# Patient Record
Sex: MALE | Race: WHITE | NOT HISPANIC OR LATINO | Employment: OTHER | URBAN - METROPOLITAN AREA
[De-identification: names, ages, dates, MRNs, and addresses within clinical notes are randomized per-mention and may not be internally consistent; named-entity substitution may affect disease eponyms.]

---

## 2017-01-23 ENCOUNTER — TELEPHONE (OUTPATIENT)
Dept: FAMILY MEDICINE | Facility: CLINIC | Age: 67
End: 2017-01-23

## 2017-01-23 RX ORDER — INDOMETHACIN 25 MG/1
25 CAPSULE ORAL 3 TIMES DAILY PRN
Qty: 60 CAPSULE | Refills: 2 | Status: SHIPPED | OUTPATIENT
Start: 2017-01-23 | End: 2017-04-26 | Stop reason: SDUPTHER

## 2017-01-23 NOTE — TELEPHONE ENCOUNTER
Pt states that he can not tolerate etodolac, it makes him feel run down.. Pt wants to go back on indomethacin 25 mg tid.  Allergies and pharmacy verified.  Please advise.

## 2017-01-23 NOTE — TELEPHONE ENCOUNTER
----- Message from Gabriel Nunes sent at 1/23/2017  4:01 PM CST -----  Contact: Patient  Patient called regarding Rx refill. Have questions about medication please call back at [phone]. Thanks,

## 2017-01-30 RX ORDER — CLOPIDOGREL BISULFATE 75 MG/1
75 TABLET ORAL DAILY
Qty: 30 TABLET | Refills: 0 | Status: SHIPPED | OUTPATIENT
Start: 2017-01-30 | End: 2017-02-01 | Stop reason: SDUPTHER

## 2017-02-01 RX ORDER — CLOPIDOGREL BISULFATE 75 MG/1
75 TABLET ORAL DAILY
Qty: 30 TABLET | Refills: 1 | Status: SHIPPED | OUTPATIENT
Start: 2017-02-01 | End: 2017-04-26 | Stop reason: SDUPTHER

## 2017-03-28 ENCOUNTER — OFFICE VISIT (OUTPATIENT)
Dept: CARDIOLOGY | Facility: CLINIC | Age: 67
End: 2017-03-28
Payer: MEDICARE

## 2017-03-28 VITALS
HEART RATE: 62 BPM | HEIGHT: 71 IN | WEIGHT: 216.94 LBS | SYSTOLIC BLOOD PRESSURE: 149 MMHG | BODY MASS INDEX: 30.37 KG/M2 | DIASTOLIC BLOOD PRESSURE: 63 MMHG

## 2017-03-28 DIAGNOSIS — I10 ESSENTIAL HYPERTENSION: ICD-10-CM

## 2017-03-28 DIAGNOSIS — E78.5 HYPERLIPIDEMIA, UNSPECIFIED HYPERLIPIDEMIA TYPE: ICD-10-CM

## 2017-03-28 DIAGNOSIS — I25.9 ISCHEMIC HEART DISEASE DUE TO CORONARY ARTERY OBSTRUCTION: Primary | ICD-10-CM

## 2017-03-28 DIAGNOSIS — I65.23 CAROTID STENOSIS, ASYMPTOMATIC, BILATERAL: ICD-10-CM

## 2017-03-28 DIAGNOSIS — I24.0 ISCHEMIC HEART DISEASE DUE TO CORONARY ARTERY OBSTRUCTION: Primary | ICD-10-CM

## 2017-03-28 PROCEDURE — 1157F ADVNC CARE PLAN IN RCRD: CPT | Mod: S$GLB,,, | Performed by: INTERNAL MEDICINE

## 2017-03-28 PROCEDURE — 1160F RVW MEDS BY RX/DR IN RCRD: CPT | Mod: S$GLB,,, | Performed by: INTERNAL MEDICINE

## 2017-03-28 PROCEDURE — 1126F AMNT PAIN NOTED NONE PRSNT: CPT | Mod: S$GLB,,, | Performed by: INTERNAL MEDICINE

## 2017-03-28 PROCEDURE — 3078F DIAST BP <80 MM HG: CPT | Mod: S$GLB,,, | Performed by: INTERNAL MEDICINE

## 2017-03-28 PROCEDURE — 99999 PR PBB SHADOW E&M-EST. PATIENT-LVL II: CPT | Mod: PBBFAC,,, | Performed by: INTERNAL MEDICINE

## 2017-03-28 PROCEDURE — 3077F SYST BP >= 140 MM HG: CPT | Mod: S$GLB,,, | Performed by: INTERNAL MEDICINE

## 2017-03-28 PROCEDURE — 1159F MED LIST DOCD IN RCRD: CPT | Mod: S$GLB,,, | Performed by: INTERNAL MEDICINE

## 2017-03-28 PROCEDURE — 99214 OFFICE O/P EST MOD 30 MIN: CPT | Mod: S$GLB,,, | Performed by: INTERNAL MEDICINE

## 2017-03-28 NOTE — PROGRESS NOTES
Subjective:    Patient ID:  Al Carter is a 67 y.o. male who presents for follow-up of Coronary Artery Disease      HPI  He comes for follow up with no major problems, no chest pain, no shortness of breath.  Still smoking, though    Review of Systems   Constitution: Negative for decreased appetite, weakness, malaise/fatigue, weight gain and weight loss.   Cardiovascular: Negative for chest pain, dyspnea on exertion, leg swelling, palpitations and syncope.   Respiratory: Negative for cough and shortness of breath.    Gastrointestinal: Negative.    All other systems reviewed and are negative.       Objective:    Physical Exam   Constitutional: He is oriented to person, place, and time. He appears well-developed and well-nourished.   HENT:   Head: Normocephalic.   Eyes: Pupils are equal, round, and reactive to light.   Neck: Normal range of motion. Neck supple. No JVD present. Carotid bruit is not present. No thyromegaly present.   Cardiovascular: Normal rate, regular rhythm, normal heart sounds, intact distal pulses and normal pulses.  PMI is not displaced.  Exam reveals no gallop.    No murmur heard.  Pulmonary/Chest: Effort normal and breath sounds normal.   Abdominal: Soft. Normal appearance. He exhibits no mass. There is no hepatosplenomegaly. There is no tenderness.   Musculoskeletal: Normal range of motion. He exhibits no edema.   Neurological: He is alert and oriented to person, place, and time. He has normal strength and normal reflexes. No sensory deficit.   Skin: Skin is warm and intact.   Psychiatric: He has a normal mood and affect.   Nursing note and vitals reviewed.        Assessment:       1. Ischemic heart disease due to coronary artery obstruction    2. Essential hypertension    3. Hyperlipidemia, unspecified hyperlipidemia type         Plan:     Continue all cardiac medications  Regular exercise program  Quit smoking  1 yr f/u with stress echo, carotids, arterial doppler of legs

## 2017-04-26 RX ORDER — CLOPIDOGREL BISULFATE 75 MG/1
TABLET ORAL
Qty: 30 TABLET | Refills: 0 | Status: SHIPPED | OUTPATIENT
Start: 2017-04-26 | End: 2017-05-30 | Stop reason: SDUPTHER

## 2017-04-26 RX ORDER — INDOMETHACIN 25 MG/1
CAPSULE ORAL
Qty: 60 CAPSULE | Refills: 0 | Status: SHIPPED | OUTPATIENT
Start: 2017-04-26 | End: 2018-10-22

## 2017-05-16 ENCOUNTER — TELEPHONE (OUTPATIENT)
Dept: CARDIOLOGY | Facility: CLINIC | Age: 67
End: 2017-05-16

## 2017-05-16 NOTE — TELEPHONE ENCOUNTER
----- Message from Alondra Hamilton sent at 5/16/2017  8:36 AM CDT -----  Contact: Al Andrade is calling to have scheduled test. Please call [phone]. Thanks!

## 2017-05-30 ENCOUNTER — CLINICAL SUPPORT (OUTPATIENT)
Dept: CARDIOLOGY | Facility: CLINIC | Age: 67
End: 2017-05-30
Payer: MEDICARE

## 2017-05-30 DIAGNOSIS — I10 ESSENTIAL HYPERTENSION: ICD-10-CM

## 2017-05-30 DIAGNOSIS — I25.9 ISCHEMIC HEART DISEASE DUE TO CORONARY ARTERY OBSTRUCTION: ICD-10-CM

## 2017-05-30 DIAGNOSIS — I65.23 CAROTID STENOSIS, ASYMPTOMATIC, BILATERAL: ICD-10-CM

## 2017-05-30 DIAGNOSIS — E78.5 HYPERLIPIDEMIA, UNSPECIFIED HYPERLIPIDEMIA TYPE: ICD-10-CM

## 2017-05-30 DIAGNOSIS — I24.0 ISCHEMIC HEART DISEASE DUE TO CORONARY ARTERY OBSTRUCTION: ICD-10-CM

## 2017-05-30 LAB
INTERNAL CAROTID STENOSIS: NORMAL
MITRAL VALVE MOBILITY: NORMAL
RETIRED EF AND QEF - SEE NOTES: 50 (ref 55–65)

## 2017-05-30 PROCEDURE — 93925 LOWER EXTREMITY STUDY: CPT | Mod: S$GLB,,, | Performed by: INTERNAL MEDICINE

## 2017-05-30 PROCEDURE — 93321 DOPPLER ECHO F-UP/LMTD STD: CPT | Mod: S$GLB,,, | Performed by: INTERNAL MEDICINE

## 2017-05-30 PROCEDURE — 93880 EXTRACRANIAL BILAT STUDY: CPT | Mod: S$GLB,,, | Performed by: INTERNAL MEDICINE

## 2017-05-30 PROCEDURE — 93351 STRESS TTE COMPLETE: CPT | Mod: S$GLB,,, | Performed by: INTERNAL MEDICINE

## 2017-05-31 RX ORDER — CLOPIDOGREL BISULFATE 75 MG/1
TABLET ORAL
Qty: 30 TABLET | Refills: 0 | Status: SHIPPED | OUTPATIENT
Start: 2017-05-31 | End: 2017-06-26 | Stop reason: SDUPTHER

## 2017-06-01 ENCOUNTER — TELEPHONE (OUTPATIENT)
Dept: CARDIOLOGY | Facility: CLINIC | Age: 67
End: 2017-06-01

## 2017-06-27 RX ORDER — CLOPIDOGREL BISULFATE 75 MG/1
TABLET ORAL
Qty: 30 TABLET | Refills: 0 | Status: SHIPPED | OUTPATIENT
Start: 2017-06-27 | End: 2017-07-29 | Stop reason: SDUPTHER

## 2017-07-17 ENCOUNTER — TELEPHONE (OUTPATIENT)
Dept: FAMILY MEDICINE | Facility: CLINIC | Age: 67
End: 2017-07-17

## 2017-07-17 NOTE — TELEPHONE ENCOUNTER
Wife states that pt is having pain in his hip and great toe. Wife thinks this could be gout. Wife requesting appt with Dr. Guzmán only. Pt scheduled for next available appt, 07/27/17 at 1300. Wife verbalized understanding.

## 2017-07-17 NOTE — TELEPHONE ENCOUNTER
----- Message from Bryanna Clay sent at 7/17/2017  9:11 AM CDT -----  Contact: Jada Carter (Spouse) [phone]  Jada Carter (Spouse) [phone] requesting this patient be worked in for an appt this Thursday after 12pm, due to hip pain, difficulty walking and she think its gout.

## 2017-07-27 ENCOUNTER — OFFICE VISIT (OUTPATIENT)
Dept: FAMILY MEDICINE | Facility: CLINIC | Age: 67
End: 2017-07-27
Payer: MEDICARE

## 2017-07-27 VITALS
OXYGEN SATURATION: 95 % | HEART RATE: 55 BPM | BODY MASS INDEX: 30.45 KG/M2 | TEMPERATURE: 98 F | DIASTOLIC BLOOD PRESSURE: 64 MMHG | SYSTOLIC BLOOD PRESSURE: 118 MMHG | WEIGHT: 217.5 LBS | HEIGHT: 71 IN

## 2017-07-27 DIAGNOSIS — E78.5 HYPERLIPIDEMIA, UNSPECIFIED HYPERLIPIDEMIA TYPE: ICD-10-CM

## 2017-07-27 DIAGNOSIS — M20.21 HALLUX RIGIDUS OF RIGHT FOOT: Primary | ICD-10-CM

## 2017-07-27 DIAGNOSIS — I10 ESSENTIAL HYPERTENSION: ICD-10-CM

## 2017-07-27 PROCEDURE — 99999 PR PBB SHADOW E&M-EST. PATIENT-LVL III: CPT | Mod: PBBFAC,,, | Performed by: FAMILY MEDICINE

## 2017-07-27 PROCEDURE — 1159F MED LIST DOCD IN RCRD: CPT | Mod: S$GLB,,, | Performed by: FAMILY MEDICINE

## 2017-07-27 PROCEDURE — 99214 OFFICE O/P EST MOD 30 MIN: CPT | Mod: S$GLB,,, | Performed by: FAMILY MEDICINE

## 2017-07-27 PROCEDURE — 1125F AMNT PAIN NOTED PAIN PRSNT: CPT | Mod: S$GLB,,, | Performed by: FAMILY MEDICINE

## 2017-07-27 NOTE — PROGRESS NOTES
"Subjective:       Patient ID: Al Carter is a 67 y.o. male.    Chief Complaint: possible gout (x 2 months)    He has been having right great toe pain for the past 2-3 months.  It is mostly painful when he walks with a push off.  He has noticed some swelling but no redness.  He says it started relatively suddenly.  He jammed that toe about 5 months ago.  He has an ongoing problem with burning pain in his right foot and has a past history of foot and leg numbness since 1981.  He has been having some left hip pain that he thinks may be related to compensating for his right great toe pain.  He is worried about the possibility of gout.  He has hypertension and hyperlipidemia and is complaining of no chest pain.      Review of Systems   Constitutional: Negative for unexpected weight change.   Respiratory: Negative for cough and shortness of breath.    Cardiovascular: Negative for chest pain, palpitations and leg swelling.   Musculoskeletal: Positive for arthralgias.       Objective:     Blood pressure 118/64, pulse (!) 55, temperature 98 °F (36.7 °C), temperature source Oral, height 5' 11" (1.803 m), weight 98.6 kg (217 lb 7.7 oz), SpO2 95 %.      Physical Exam   Constitutional: He appears well-developed and well-nourished. No distress.   Cardiovascular: Normal rate, regular rhythm, normal heart sounds and intact distal pulses.    Pulmonary/Chest: Effort normal and breath sounds normal. No respiratory distress.   Musculoskeletal:   He has very minimal movement of the right first MTP.  Tender to pressure on the dorsal and medial aspects.  His other toes move fairly well.  There is no warmth or redness.    No tenderness over the trochanteric bursa.  He has fairly good nonpainful motion of the left hip.  Mild left sacroiliac tenderness.       Assessment:       1. Hallux rigidus of right foot    2. Essential hypertension    3. Hyperlipidemia, unspecified hyperlipidemia type        Plan:       X-ray and orthopedic consult.  " He may benefit from some shoe inserts to keep his foot more rigid.  Labwork ordered

## 2017-07-28 ENCOUNTER — HOSPITAL ENCOUNTER (OUTPATIENT)
Dept: RADIOLOGY | Facility: HOSPITAL | Age: 67
Discharge: HOME | End: 2017-07-28
Attending: FAMILY MEDICINE
Payer: MEDICARE

## 2017-07-28 DIAGNOSIS — M20.21 HALLUX RIGIDUS OF RIGHT FOOT: ICD-10-CM

## 2017-07-28 PROCEDURE — 73630 X-RAY EXAM OF FOOT: CPT | Mod: 26,RT,, | Performed by: RADIOLOGY

## 2017-07-28 PROCEDURE — 73630 X-RAY EXAM OF FOOT: CPT | Mod: TC,PO,RT

## 2017-07-31 RX ORDER — CLOPIDOGREL BISULFATE 75 MG/1
TABLET ORAL
Qty: 30 TABLET | Refills: 0 | Status: SHIPPED | OUTPATIENT
Start: 2017-07-31 | End: 2017-08-26 | Stop reason: SDUPTHER

## 2017-08-01 ENCOUNTER — OFFICE VISIT (OUTPATIENT)
Dept: ORTHOPEDICS | Facility: CLINIC | Age: 67
End: 2017-08-01
Payer: MEDICARE

## 2017-08-01 VITALS
HEART RATE: 68 BPM | SYSTOLIC BLOOD PRESSURE: 136 MMHG | HEIGHT: 71 IN | WEIGHT: 217 LBS | DIASTOLIC BLOOD PRESSURE: 70 MMHG | BODY MASS INDEX: 30.38 KG/M2

## 2017-08-01 DIAGNOSIS — M20.21 HALLUX RIGIDUS OF RIGHT FOOT: Primary | ICD-10-CM

## 2017-08-01 PROCEDURE — 1125F AMNT PAIN NOTED PAIN PRSNT: CPT | Mod: S$GLB,,, | Performed by: ORTHOPAEDIC SURGERY

## 2017-08-01 PROCEDURE — 20600 DRAIN/INJ JOINT/BURSA W/O US: CPT | Mod: RT,S$GLB,, | Performed by: ORTHOPAEDIC SURGERY

## 2017-08-01 PROCEDURE — 99999 PR PBB SHADOW E&M-EST. PATIENT-LVL III: CPT | Mod: PBBFAC,,, | Performed by: ORTHOPAEDIC SURGERY

## 2017-08-01 PROCEDURE — 99203 OFFICE O/P NEW LOW 30 MIN: CPT | Mod: 25,S$GLB,, | Performed by: ORTHOPAEDIC SURGERY

## 2017-08-01 PROCEDURE — 1159F MED LIST DOCD IN RCRD: CPT | Mod: S$GLB,,, | Performed by: ORTHOPAEDIC SURGERY

## 2017-08-01 RX ORDER — TRIAMCINOLONE ACETONIDE 40 MG/ML
40 INJECTION, SUSPENSION INTRA-ARTICULAR; INTRAMUSCULAR
Status: DISCONTINUED | OUTPATIENT
Start: 2017-08-01 | End: 2017-08-01 | Stop reason: HOSPADM

## 2017-08-01 RX ADMIN — TRIAMCINOLONE ACETONIDE 40 MG: 40 INJECTION, SUSPENSION INTRA-ARTICULAR; INTRAMUSCULAR at 09:08

## 2017-08-01 NOTE — PROGRESS NOTES
"HPI: Al Carter is a 67 y.o. male who was referred to me by Dr. Guzmán and was seen in consultation today for right great toe pain.     He says the pain started about 5-6 months ago and the toe became very swollen and painful. He has been taking Indocin for 2-3 weeks without relief. He rates his pain as 5/10 today.     PAST MEDICAL/SURGICAL/FAMILY/SOCIAL/ HISTORY: REVIEWED    ALLERGIES/MEDICATIONS: REVIEWED       Review of Systems:     Constitution: Negative.   HEENT: Negative.   Eyes: Negative.   Cardiovascular: Negative.   Respiratory: Negative.   Endocrine: Negative.   Hematologic/Lymphatic: Negative.   Skin: Negative.   Musculoskeletal: Positive for right pain   Gastrointestinal: Negative.   Genitourinary: Negative.   Neurological: Negative.   Psychiatric/Behavioral: Negative.   Allergic/Immunologic: Negative.       PHYSICAL EXAM:  Vitals:    08/01/17 0853   BP: 136/70   Pulse: 68     Ht Readings from Last 1 Encounters:   08/01/17 5' 11" (1.803 m)     Wt Readings from Last 1 Encounters:   08/01/17 98.4 kg (217 lb)         GENERAL: Well developed, well nourished, no acute distress.  SKIN: Skin is intact. No atrophy, abrasions or lesions are noted.   Neurological: Normal mental status. Appropriate and conversant. Alert and oriented x 3.  GAIT: Walks with mildly antalgic gait.    Right lower extremity compared with LLE:  2+ dorsalis pedis pulse.  Capillary refill < 3 seconds.  Normal range of motion tibiotalar and subtalar joints. Normal alignment of the forefoot and the hindfoot.  5/5 strength EHL, FHL, tibialis anterior, gastrocsoleus, tibialis posterior and peroneals. Sensation to light touch intact sural, saphenous, superficial peroneal and deep peroneal nerves. + swelling and decreased range of motion of the great toe mtpj with pain on testing. No lymphadenopathy, masses or tumors palpated      XRAYS:   3 views of right foot obtained and reviewed today reveal moderate to severe osteoarthritis of the 1st  " metatarsalphalangeal joint.       ASSESSMENT:        Encounter Diagnosis   Name Primary?    Hallux rigidus of right foot         PLAN:  I spent 15 minutes in consulation with the patient today. More than half the time was spent counseling the patient on his condition and the options for operative versus non-operative care.  I injected the right great toe today. I discussed with him that even though his uric acid was 6.2 he may still have gout.  I can repeat injections every 4-6 months or prn as needed.

## 2017-08-01 NOTE — PROCEDURES
Small Joint Aspiration/Injection  Date/Time: 8/1/2017 9:24 AM  Performed by: JENNIFER DELA CRUZ  Authorized by: JENNIFER DELA CRUZ     Consent Done?:  Yes (Verbal)  Indications:  Pain  Site marked: The procedure site was marked      Location:  Great toe  Site:  R great MTP  Prep: Patient was prepped and draped in usual sterile fashion    Ultrasonic Guidance for needle placement: No  Needle size:  27 G  Approach:  Dorsal  Medications:  40 mg triamcinolone acetonide 40 mg/mL  Patient tolerance:  Patient tolerated the procedure well with no immediate complications

## 2017-08-01 NOTE — LETTER
August 1, 2017      Cj Guzmán MD  [address]  [facility] [address]           [facility]  [address] [facility] [address] [place] [address]  Phone: [phone]          Patient: Al Carter   MR Number: [id number]   YOB: 1950   Date of Visit: 8/1/2017       Dear Dr. Cj Guzmán:    Thank you for referring Al Carter to me for evaluation. Attached you will find relevant portions of my assessment and plan of care.    If you have questions, please do not hesitate to call me. I look forward to following Al Carter along with you.    Sincerely,    Javi Martines MD    Enclosure  CC:  No Recipients    If you would like to receive this communication electronically, please contact [email or web address] or [phone] to request more information on [employer] access.    For providers and/or their staff who would like to refer a patient to [facility], please contact us through our one-stop-shop provider referral line, [facility] , at [phone].    If you feel you have received this communication in error or would no longer like to receive these types of communications, please e-mail [email or web address]

## 2017-08-28 RX ORDER — CLOPIDOGREL BISULFATE 75 MG/1
TABLET ORAL
Qty: 30 TABLET | Refills: 0 | Status: SHIPPED | OUTPATIENT
Start: 2017-08-28 | End: 2017-09-27 | Stop reason: SDUPTHER

## 2017-09-28 RX ORDER — CLOPIDOGREL BISULFATE 75 MG/1
TABLET ORAL
Qty: 30 TABLET | Refills: 0 | Status: SHIPPED | OUTPATIENT
Start: 2017-09-28 | End: 2017-10-26 | Stop reason: SDUPTHER

## 2017-10-24 ENCOUNTER — PATIENT OUTREACH (OUTPATIENT)
Dept: ADMINISTRATIVE | Facility: HOSPITAL | Age: 67
End: 2017-10-24

## 2017-10-24 NOTE — LETTER
October 24, 2017    Al Carter  [address] [facility] [id number] [facility]  [address]  [facility] [address]  Phone: [phone] Dear Mr. Carter:    [facility] is committed to your overall health.  To help you get the most out of each of your visits, we will review your information to make sure you are up to date on all of your recommended tests and/or procedures.      Dr. Cj Guzmán has found that your chart shows you may be due for hepatitis C screening, colon cancer screening, a screening abdominal ultrasound, and possibly pneumonia and flu immunizations.     Medicare does not cover all immunizations to be given in the clinic.  Check your benefits to ensure that you do not need to receive your immunizations at the pharmacy.    If you have had any of the above done at another facility, please bring the records or information with you so that your record at [facility] will be complete.  If you would like to schedule any of these, please contact me.    If you are currently taking medication, please bring it with you to your appointment for review.    Also, if you have any type of Advanced Directives, please bring them with you to your office visit so we may scan them into your chart.    If you have any questions or concerns, please don't hesitate to call.    Thank you for letting us care for you,  Mara Sharpe LPN Clinical Care Coordinator  [facility]  [phone]

## 2017-10-27 RX ORDER — CLOPIDOGREL BISULFATE 75 MG/1
TABLET ORAL
Qty: 30 TABLET | Refills: 0 | Status: SHIPPED | OUTPATIENT
Start: 2017-10-27 | End: 2017-11-27 | Stop reason: SDUPTHER

## 2017-11-02 ENCOUNTER — OFFICE VISIT (OUTPATIENT)
Dept: FAMILY MEDICINE | Facility: CLINIC | Age: 67
End: 2017-11-02
Payer: MEDICARE

## 2017-11-02 ENCOUNTER — TELEPHONE (OUTPATIENT)
Dept: FAMILY MEDICINE | Facility: CLINIC | Age: 67
End: 2017-11-02

## 2017-11-02 ENCOUNTER — LAB VISIT (OUTPATIENT)
Dept: LAB | Facility: HOSPITAL | Age: 67
End: 2017-11-02
Attending: FAMILY MEDICINE
Payer: MEDICARE

## 2017-11-02 VITALS
HEART RATE: 70 BPM | HEIGHT: 72 IN | BODY MASS INDEX: 29.5 KG/M2 | WEIGHT: 217.81 LBS | DIASTOLIC BLOOD PRESSURE: 74 MMHG | SYSTOLIC BLOOD PRESSURE: 124 MMHG | TEMPERATURE: 99 F

## 2017-11-02 DIAGNOSIS — Z12.5 SCREENING FOR PROSTATE CANCER: ICD-10-CM

## 2017-11-02 DIAGNOSIS — I25.9 ISCHEMIC HEART DISEASE DUE TO CORONARY ARTERY OBSTRUCTION: Primary | ICD-10-CM

## 2017-11-02 DIAGNOSIS — I24.0 ISCHEMIC HEART DISEASE DUE TO CORONARY ARTERY OBSTRUCTION: ICD-10-CM

## 2017-11-02 DIAGNOSIS — M17.10 ARTHRITIS OF KNEE: ICD-10-CM

## 2017-11-02 DIAGNOSIS — I24.0 ISCHEMIC HEART DISEASE DUE TO CORONARY ARTERY OBSTRUCTION: Primary | ICD-10-CM

## 2017-11-02 DIAGNOSIS — Z72.0 TOBACCO ABUSE: ICD-10-CM

## 2017-11-02 DIAGNOSIS — E78.5 HYPERLIPIDEMIA, UNSPECIFIED HYPERLIPIDEMIA TYPE: ICD-10-CM

## 2017-11-02 DIAGNOSIS — I10 ESSENTIAL HYPERTENSION: ICD-10-CM

## 2017-11-02 DIAGNOSIS — I25.9 ISCHEMIC HEART DISEASE DUE TO CORONARY ARTERY OBSTRUCTION: ICD-10-CM

## 2017-11-02 PROCEDURE — 84153 ASSAY OF PSA TOTAL: CPT

## 2017-11-02 PROCEDURE — 86803 HEPATITIS C AB TEST: CPT

## 2017-11-02 PROCEDURE — 99999 PR PBB SHADOW E&M-EST. PATIENT-LVL III: CPT | Mod: PBBFAC,,, | Performed by: FAMILY MEDICINE

## 2017-11-02 PROCEDURE — 36415 COLL VENOUS BLD VENIPUNCTURE: CPT | Mod: PO

## 2017-11-02 PROCEDURE — 99397 PER PM REEVAL EST PAT 65+ YR: CPT | Mod: S$GLB,,, | Performed by: FAMILY MEDICINE

## 2017-11-02 RX ORDER — HYDROCODONE BITARTRATE AND ACETAMINOPHEN 10; 325 MG/1; MG/1
1 TABLET ORAL 2 TIMES DAILY PRN
Qty: 40 TABLET | Refills: 0 | Status: SHIPPED | OUTPATIENT
Start: 2017-11-02 | End: 2018-10-22

## 2017-11-02 RX ORDER — EZETIMIBE 10 MG/1
10 TABLET ORAL DAILY
Qty: 90 TABLET | Refills: 3 | Status: SHIPPED | OUTPATIENT
Start: 2017-11-02 | End: 2018-10-22

## 2017-11-02 NOTE — PROGRESS NOTES
Subjective:       Patient ID: Al Carter is a 67 y.o. male.    Chief Complaint: Annual Exam (Annual check up. Poss due for lab. )    Annual exam.  He has coronary artery disease.  No chest pain.  He had a negative stress echo earlier this year.  He has a history of hypertension but is not on any blood pressure medicine.  He has hyperlipidemia but has been intolerant of 3 separate statins.  He continues to smoke 2 packs a day.  He has left knee and right shoulder arthritic complaints.  He only takes indomethacin episodically.  He had some hydrocodone last year that helped with his arthritic pain kept him awake.  He would like to have a refill.  He is due for colon cancer screening and needs a hepatitis C test.  He is also eligible for AAA screen.  He declines immunizations.      Review of Systems   Constitutional: Negative for fatigue, fever and unexpected weight change.   HENT: Negative.    Eyes: Negative for visual disturbance.   Respiratory: Negative for cough, shortness of breath and wheezing.    Cardiovascular: Negative for chest pain, palpitations and leg swelling.   Gastrointestinal: Negative for abdominal pain, blood in stool and diarrhea.   Genitourinary: Negative for difficulty urinating and hematuria.   Musculoskeletal: Positive for arthralgias.   Skin:        No neoplasms    Neurological: Negative for weakness and numbness.       Objective:     Blood pressure 124/74, pulse 70, temperature 99.2 °F (37.3 °C), temperature source Oral, height 6' (1.829 m), weight 98.8 kg (217 lb 13 oz).      Physical Exam   Constitutional: He appears well-developed and well-nourished.   No distress   HENT:   Nose clear. TM's wnl. No oral lesions.    Eyes: Conjunctivae and EOM are normal. Pupils are equal, round, and reactive to light.   Neck: Normal range of motion. No thyromegaly present.   Cardiovascular: Normal rate, regular rhythm, normal heart sounds and intact distal pulses.    No carotid bruits   Pulmonary/Chest:  Effort normal and breath sounds normal. He has no wheezes. He has no rales.   Abdominal: Soft. Bowel sounds are normal. He exhibits no mass. There is no tenderness.   Nonpalpable aorta   Musculoskeletal: He exhibits no edema.   Both knees lack 5° of extension   Lymphadenopathy:     He has no cervical adenopathy.   Neurological: He is alert.   Skin:   No rash or lesions   Psychiatric: He has a normal mood and affect.       Assessment:       1. Ischemic heart disease due to coronary artery obstruction    2. Essential hypertension    3. Tobacco abuse    4. Hyperlipidemia, unspecified hyperlipidemia type    5. Arthritis of knee    6. Screening for prostate cancer        Plan:       Hepatitis C PSA ordered.  Trial of Zetia 10 mg daily.  AAA screen.  Cologuard.  Declines immunizations.  He has not tried to cut down on smoking since 2011.  I don't think he intends to.

## 2017-11-03 ENCOUNTER — HOSPITAL ENCOUNTER (OUTPATIENT)
Dept: RADIOLOGY | Facility: HOSPITAL | Age: 67
Discharge: HOME | End: 2017-11-03
Attending: FAMILY MEDICINE
Payer: MEDICARE

## 2017-11-03 DIAGNOSIS — I24.0 ISCHEMIC HEART DISEASE DUE TO CORONARY ARTERY OBSTRUCTION: ICD-10-CM

## 2017-11-03 DIAGNOSIS — I25.9 ISCHEMIC HEART DISEASE DUE TO CORONARY ARTERY OBSTRUCTION: ICD-10-CM

## 2017-11-03 LAB — COMPLEXED PSA SERPL-MCNC: 2.5 NG/ML

## 2017-11-03 PROCEDURE — 76706 US ABDL AORTA SCREEN AAA: CPT | Mod: 26,,, | Performed by: RADIOLOGY

## 2017-11-03 PROCEDURE — 76706 US ABDL AORTA SCREEN AAA: CPT | Mod: TC,PO

## 2017-11-06 LAB — HCV AB SERPL QL IA: NEGATIVE

## 2017-11-08 ENCOUNTER — TELEPHONE (OUTPATIENT)
Dept: FAMILY MEDICINE | Facility: CLINIC | Age: 67
End: 2017-11-08

## 2017-11-08 NOTE — TELEPHONE ENCOUNTER
----- Message from Janene Perez sent at 11/8/2017  4:20 PM CST -----  Contact: Patient's wife, Jada  Patient's wife is stating that the Stool DNA test kit was supposed to come in the mail for patient and they have never received it.  Call Back#[phone]  Thanks

## 2017-11-08 NOTE — TELEPHONE ENCOUNTER
Tried to reach pt. No answer, left msg to call back.    Contacting to see what kit they are talking about.

## 2017-11-09 NOTE — TELEPHONE ENCOUNTER
Spoke w/ Jada. Advise Cologuard orders have been faxed and to give them a week as it may take a little more time for them to process and ship. She agreed.

## 2017-11-15 LAB — HEMOCCULT STL QL IA: NEGATIVE

## 2017-11-27 RX ORDER — TRAZODONE HYDROCHLORIDE 100 MG/1
TABLET ORAL
Qty: 90 TABLET | Refills: 3 | Status: SHIPPED | OUTPATIENT
Start: 2017-11-27 | End: 2018-11-19 | Stop reason: SDUPTHER

## 2017-11-28 ENCOUNTER — TELEPHONE (OUTPATIENT)
Dept: ADMINISTRATIVE | Facility: HOSPITAL | Age: 67
End: 2017-11-28

## 2017-11-29 RX ORDER — CLOPIDOGREL BISULFATE 75 MG/1
TABLET ORAL
Qty: 30 TABLET | Refills: 0 | Status: SHIPPED | OUTPATIENT
Start: 2017-11-29 | End: 2017-12-26 | Stop reason: SDUPTHER

## 2017-12-26 RX ORDER — CLOPIDOGREL BISULFATE 75 MG/1
TABLET ORAL
Qty: 30 TABLET | Refills: 0 | Status: SHIPPED | OUTPATIENT
Start: 2017-12-26 | End: 2018-01-28 | Stop reason: SDUPTHER

## 2018-01-28 RX ORDER — CLOPIDOGREL BISULFATE 75 MG/1
TABLET ORAL
Qty: 30 TABLET | Refills: 0 | Status: SHIPPED | OUTPATIENT
Start: 2018-01-28 | End: 2018-03-01 | Stop reason: SDUPTHER

## 2018-03-01 DIAGNOSIS — I10 HYPERTENSION, UNSPECIFIED TYPE: Primary | ICD-10-CM

## 2018-03-01 NOTE — TELEPHONE ENCOUNTER
Schedule patient for an upcoming appointment with Dr. Calix and phone in plavix at [facility]. Patient agrees and understands.

## 2018-03-01 NOTE — TELEPHONE ENCOUNTER
----- Message from Nanci Mcleod sent at 3/1/2018  8:40 AM CST -----  Contact: self  Patient is checking the status of medication clopidogrel (PLAVIX) 75 mg tablet    Please call to advise [phone]       [facility] [address] [place], [place] [address] [place] [address] AT [facility] [address] [place] [address]  [address] [address] [address]  Phone: [phone] Fax: [phone]

## 2018-03-02 RX ORDER — CLOPIDOGREL BISULFATE 75 MG/1
75 TABLET ORAL DAILY
Qty: 30 TABLET | Refills: 1 | Status: SHIPPED | OUTPATIENT
Start: 2018-03-02 | End: 2018-04-04

## 2018-03-04 RX ORDER — CLOPIDOGREL BISULFATE 75 MG/1
TABLET ORAL
Qty: 30 TABLET | Refills: 0 | Status: SHIPPED | OUTPATIENT
Start: 2018-03-04 | End: 2018-04-04 | Stop reason: SDUPTHER

## 2018-03-05 ENCOUNTER — TELEPHONE (OUTPATIENT)
Dept: FAMILY MEDICINE | Facility: CLINIC | Age: 68
End: 2018-03-05

## 2018-03-05 NOTE — TELEPHONE ENCOUNTER
----- Message from Akhil Avalos sent at 3/5/2018  9:37 AM CST -----  Contact: self   Patient want to speak with a nurse regarding rh factor and blood type please call back at [phone] (home)

## 2018-04-03 DIAGNOSIS — M25.511 CHRONIC PAIN OF BOTH SHOULDERS: Primary | ICD-10-CM

## 2018-04-03 DIAGNOSIS — G89.29 CHRONIC PAIN OF BOTH SHOULDERS: Primary | ICD-10-CM

## 2018-04-03 DIAGNOSIS — M25.512 CHRONIC PAIN OF BOTH SHOULDERS: Primary | ICD-10-CM

## 2018-04-04 ENCOUNTER — HOSPITAL ENCOUNTER (OUTPATIENT)
Dept: RADIOLOGY | Facility: HOSPITAL | Age: 68
Discharge: HOME | End: 2018-04-04
Attending: ORTHOPAEDIC SURGERY
Payer: MEDICARE

## 2018-04-04 ENCOUNTER — OFFICE VISIT (OUTPATIENT)
Dept: ORTHOPEDICS | Facility: CLINIC | Age: 68
End: 2018-04-04
Payer: MEDICARE

## 2018-04-04 ENCOUNTER — OFFICE VISIT (OUTPATIENT)
Dept: CARDIOLOGY | Facility: CLINIC | Age: 68
End: 2018-04-04
Payer: MEDICARE

## 2018-04-04 VITALS
BODY MASS INDEX: 29.23 KG/M2 | WEIGHT: 215.81 LBS | SYSTOLIC BLOOD PRESSURE: 152 MMHG | DIASTOLIC BLOOD PRESSURE: 70 MMHG | HEART RATE: 61 BPM | HEIGHT: 72 IN

## 2018-04-04 VITALS — BODY MASS INDEX: 29.39 KG/M2 | WEIGHT: 217 LBS | HEIGHT: 72 IN

## 2018-04-04 DIAGNOSIS — M25.511 CHRONIC PAIN OF BOTH SHOULDERS: Primary | ICD-10-CM

## 2018-04-04 DIAGNOSIS — M19.011 PRIMARY OSTEOARTHRITIS OF BOTH SHOULDERS: ICD-10-CM

## 2018-04-04 DIAGNOSIS — M25.512 CHRONIC PAIN OF BOTH SHOULDERS: ICD-10-CM

## 2018-04-04 DIAGNOSIS — M25.512 CHRONIC PAIN OF BOTH SHOULDERS: Primary | ICD-10-CM

## 2018-04-04 DIAGNOSIS — E78.2 MIXED HYPERLIPIDEMIA: ICD-10-CM

## 2018-04-04 DIAGNOSIS — G56.01 CARPAL TUNNEL SYNDROME OF RIGHT WRIST: ICD-10-CM

## 2018-04-04 DIAGNOSIS — I24.0 ISCHEMIC HEART DISEASE DUE TO CORONARY ARTERY OBSTRUCTION: ICD-10-CM

## 2018-04-04 DIAGNOSIS — G89.29 CHRONIC PAIN OF BOTH SHOULDERS: Primary | ICD-10-CM

## 2018-04-04 DIAGNOSIS — M25.511 CHRONIC PAIN OF BOTH SHOULDERS: ICD-10-CM

## 2018-04-04 DIAGNOSIS — I10 ESSENTIAL HYPERTENSION: Primary | ICD-10-CM

## 2018-04-04 DIAGNOSIS — M19.012 PRIMARY OSTEOARTHRITIS OF BOTH SHOULDERS: ICD-10-CM

## 2018-04-04 DIAGNOSIS — I25.9 ISCHEMIC HEART DISEASE DUE TO CORONARY ARTERY OBSTRUCTION: ICD-10-CM

## 2018-04-04 DIAGNOSIS — G89.29 CHRONIC PAIN OF BOTH SHOULDERS: ICD-10-CM

## 2018-04-04 PROCEDURE — 99214 OFFICE O/P EST MOD 30 MIN: CPT | Mod: S$GLB,,, | Performed by: INTERNAL MEDICINE

## 2018-04-04 PROCEDURE — 20526 THER INJECTION CARP TUNNEL: CPT | Mod: RT,S$GLB,, | Performed by: ORTHOPAEDIC SURGERY

## 2018-04-04 PROCEDURE — 73030 X-RAY EXAM OF SHOULDER: CPT | Mod: 26,50,, | Performed by: RADIOLOGY

## 2018-04-04 PROCEDURE — 99999 PR PBB SHADOW E&M-EST. PATIENT-LVL II: CPT | Mod: PBBFAC,,, | Performed by: INTERNAL MEDICINE

## 2018-04-04 PROCEDURE — 3077F SYST BP >= 140 MM HG: CPT | Mod: CPTII,S$GLB,, | Performed by: ORTHOPAEDIC SURGERY

## 2018-04-04 PROCEDURE — 99214 OFFICE O/P EST MOD 30 MIN: CPT | Mod: 25,S$GLB,, | Performed by: ORTHOPAEDIC SURGERY

## 2018-04-04 PROCEDURE — 3077F SYST BP >= 140 MM HG: CPT | Mod: CPTII,S$GLB,, | Performed by: INTERNAL MEDICINE

## 2018-04-04 PROCEDURE — 73030 X-RAY EXAM OF SHOULDER: CPT | Mod: TC,50,PO

## 2018-04-04 PROCEDURE — 20610 DRAIN/INJ JOINT/BURSA W/O US: CPT | Mod: 51,LT,S$GLB, | Performed by: ORTHOPAEDIC SURGERY

## 2018-04-04 PROCEDURE — 99999 PR PBB SHADOW E&M-EST. PATIENT-LVL II: CPT | Mod: PBBFAC,,, | Performed by: ORTHOPAEDIC SURGERY

## 2018-04-04 PROCEDURE — 3078F DIAST BP <80 MM HG: CPT | Mod: CPTII,S$GLB,, | Performed by: INTERNAL MEDICINE

## 2018-04-04 PROCEDURE — 3078F DIAST BP <80 MM HG: CPT | Mod: CPTII,S$GLB,, | Performed by: ORTHOPAEDIC SURGERY

## 2018-04-04 RX ORDER — CLOPIDOGREL BISULFATE 75 MG/1
75 TABLET ORAL DAILY
Qty: 30 TABLET | Refills: 11 | Status: SHIPPED | OUTPATIENT
Start: 2018-04-04 | End: 2018-04-22 | Stop reason: SDUPTHER

## 2018-04-04 RX ORDER — TRIAMCINOLONE ACETONIDE 40 MG/ML
80 INJECTION, SUSPENSION INTRA-ARTICULAR; INTRAMUSCULAR
Status: DISCONTINUED | OUTPATIENT
Start: 2018-04-04 | End: 2018-04-04 | Stop reason: HOSPADM

## 2018-04-04 RX ADMIN — TRIAMCINOLONE ACETONIDE 80 MG: 40 INJECTION, SUSPENSION INTRA-ARTICULAR; INTRAMUSCULAR at 12:04

## 2018-04-04 NOTE — PROCEDURES
Carpal Tunnel  Date/Time: 4/4/2018 12:09 PM  Performed by: KRIS STOKES  Authorized by: KRIS STOKES     Consent Done?: Yes (Verbal)  Indications: Pain    Location:  Wrist  Ultrasonic Guidance for needle placement: No  Needle size:  25 G  Medications:  20 mg triamcinolone hexacetonide 20 mg/mL  Patient tolerance:  Patient tolerated the procedure well with no immediate complications

## 2018-04-04 NOTE — PROCEDURES
Large Joint Aspiration/Injection  Date/Time: 4/4/2018 12:09 PM  Performed by: KRIS STOKES  Authorized by: KRIS STOKES     Consent Done?:  Yes (Verbal)  Indications:  Pain  Procedure site marked: Yes    Timeout: Prior to procedure the correct patient, procedure, and site was verified      Location:  Shoulder  Site:  L subacromial bursa  Prep: Patient was prepped and draped in usual sterile fashion    Ultrasonic Guidance for needle placement: No  Needle size:  21 G  Approach:  Posterior  Medications:  80 mg triamcinolone acetonide 40 mg/mL  Patient tolerance:  Patient tolerated the procedure well with no immediate complications

## 2018-04-04 NOTE — PROGRESS NOTES
Al Carter, 68 years old, complaining of pain in his left shoulder and   numbness and tingling in his right hand and wrist.  He has had this now for a   couple of years' time.  Left shoulder pain, he has pain in nighttime when he   tries to sleep.  Difficulty putting his arm up over his head or reaching behind   the back, he has wrist pain, produces aching pain in his wrist when he is on the   cell phone for a while.  He has numbness and tingling in the median nerve   distribution of the hand.    PHYSICAL EXAMINATION:  Today shows positive Neer and Valdes impingement sign,   left shoulder, weak and painful cuff strength.  Positive Tinel's and Phalen's at   the right wrist.  No signs of infection or instability.    ASSESSMENT:   1.  Left shoulder cuff tendinitis, possible cuff tear.  2.  Right wrist carpal tunnel syndrome.    PLAN:  Kenalog injection.  Follow up as needed.    Procedure Note:    After obtaining consent and giving kenalog information and Lewis post-injection information, the injection site was prepped in a sterile fashion. The right wrist was  injected with 20 mg kenalog and .5 cc of 1% lidocaine using an ulnar sided approach.  No medication was wasted.  Patient tolerated the injection well. Patient was instructed to implement a period of relative rest for 24hrs and report any adverse reactions.         PBB/HN  dd: 04/04/2018 10:14:31 (CDT)  td: 04/04/2018 23:43:42 (CDT)  Doc ID   #[id number]  Job ID #[id number]    CC:     Further History  Aching pain  Worse with activity  Relieved with rest  No other associated symptoms  No other radiation    Further Exam  Alert and oriented  Pleasant  Contralateral limb has appropriate range of motion for age and condition  Contralateral limb has appropriate strength for age and condition  Contralateral limb has appropriate stability  for age and condition  No adenopathy  Pulses are appropriate for current condition  Skin is intact        Chief Complaint    Chief  Complaint   Patient presents with    Right Shoulder - Pain    Left Shoulder - Pain       HPI  Al Carter is a 68 y.o.  male who presents with       Past Medical History  Past Medical History:   Diagnosis Date    Anticoagulant long-term use     ASA 81 mg, Plavix    Arthritis     Knees and Hands    Cancer     Skin, face    Coronary artery disease     with 2 stents    Hyperlipidemia     Melanoma     Squamous cell carcinoma        Past Surgical History  Past Surgical History:   Procedure Laterality Date    BACK SURGERY  1976, 1981    L5 Laminectomy, X 2    CARDIAC CATHETERIZATION  10/2011    1 stent LAD    CARDIAC SURGERY      COLONOSCOPY      JOINT REPLACEMENT Right     Knee, total    KNEE ARTHROSCOPY Left        Medications  Current Outpatient Prescriptions   Medication Sig    clopidogrel (PLAVIX) 75 mg tablet Take 1 tablet (75 mg total) by mouth once daily.    ezetimibe (ZETIA) 10 mg tablet Take 1 tablet (10 mg total) by mouth once daily.    hydrocodone-acetaminophen 10-325mg (LORTAB )  mg Tab Take 1 tablet by mouth 2 (two) times daily as needed.    indomethacin (INDOCIN) 25 MG capsule TAKE 1 CAPSULE BY MOUTH THREE TIMES DAILY AS NEEDED    nitroGLYCERIN (NITROSTAT) 0.4 MG SL tablet Place 0.4 mg under the tongue every 5 (five) minutes as needed.      traZODone (DESYREL) 100 MG tablet TAKE 1 TABLET(100 MG) BY MOUTH EVERY EVENING     No current facility-administered medications for this visit.        Allergies  Review of patient's allergies indicates:   Allergen Reactions    Crestor [rosuvastatin]     Lipitor [atorvastatin]     Statins-hmg-coa reductase inhibitors      Weakness and muscle pain       Family History  Family History   Problem Relation Age of Onset    Hypertension Sister     Diabetes Neg Hx        Social History  Social History     Social History    Marital status:      Spouse name: N/A    Number of children: N/A    Years of education: N/A     Occupational  History    Not on file.     Social History Main Topics    Smoking status: Current Some Day Smoker     Packs/day: 0.25     Types: Cigarettes     Start date: 9/24/1964    Smokeless tobacco: Never Used      Comment: Smoked 3 packs at most, now down to 6-7 per day    Alcohol use 0.0 oz/week      Comment: rare    Drug use: No    Sexual activity: Not on file     Other Topics Concern    Not on file     Social History Narrative    No narrative on file               Review of Systems     Constitutional: Negative    HENT: Negative  Eyes: Negative  Respiratory: Negative  Cardiovascular: Negative  Musculoskeletal: HPI  Skin: Negative  Neurological: Negative  Hematological: Negative  Endocrine: Negative                 Physical Exam    There were no vitals filed for this visit.  Body mass index is 29.43 kg/m².  Physical Examination:     General appearance -  well appearing, and in no distress  Mental status - awake  Neck - supple  Chest -  symmetric air entry  Heart - normal rate   Abdomen - soft      Assessment     1. Chronic pain of both shoulders    2. Primary osteoarthritis of both shoulders    3. Carpal tunnel syndrome of right wrist          Plan

## 2018-04-04 NOTE — PROGRESS NOTES
Subjective:    Patient ID:  Al Carter is a 68 y.o. male who presents for follow-up of ICM    HPI  He comes for follow up with no major problems, no chest pain, no shortness of breath.      Review of Systems   Constitution: Negative for decreased appetite, weakness, malaise/fatigue, weight gain and weight loss.   Cardiovascular: Negative for chest pain, dyspnea on exertion, leg swelling, palpitations and syncope.   Respiratory: Negative for cough and shortness of breath.    Gastrointestinal: Negative.    All other systems reviewed and are negative.       Objective:    Physical Exam   Constitutional: He is oriented to person, place, and time. He appears well-developed and well-nourished.   HENT:   Head: Normocephalic.   Eyes: Pupils are equal, round, and reactive to light.   Neck: Normal range of motion. Neck supple. No JVD present. Carotid bruit is not present. No thyromegaly present.   Cardiovascular: Normal rate, regular rhythm, normal heart sounds, intact distal pulses and normal pulses.  PMI is not displaced.  Exam reveals no gallop.    No murmur heard.  Pulmonary/Chest: Effort normal and breath sounds normal.   Abdominal: Soft. Normal appearance. He exhibits no mass. There is no hepatosplenomegaly. There is no tenderness.   Musculoskeletal: Normal range of motion. He exhibits no edema.   Neurological: He is alert and oriented to person, place, and time. He has normal strength and normal reflexes. No sensory deficit.   Skin: Skin is warm and intact.   Psychiatric: He has a normal mood and affect.   Nursing note and vitals reviewed.        Assessment:       1. Essential hypertension    2. Ischemic heart disease due to coronary artery obstruction    3. Mixed hyperlipidemia         Plan:     Continue all cardiac medications  Regular exercise program  Weight loss  1 yr f/u with [facility]

## 2018-04-23 RX ORDER — CLOPIDOGREL BISULFATE 75 MG/1
TABLET ORAL
Qty: 30 TABLET | Refills: 0 | Status: SHIPPED | OUTPATIENT
Start: 2018-04-23 | End: 2018-05-20 | Stop reason: SDUPTHER

## 2018-05-20 RX ORDER — CLOPIDOGREL BISULFATE 75 MG/1
TABLET ORAL
Qty: 30 TABLET | Refills: 0 | Status: SHIPPED | OUTPATIENT
Start: 2018-05-20 | End: 2018-06-19 | Stop reason: SDUPTHER

## 2018-06-20 RX ORDER — CLOPIDOGREL BISULFATE 75 MG/1
TABLET ORAL
Qty: 30 TABLET | Refills: 0 | Status: SHIPPED | OUTPATIENT
Start: 2018-06-20 | End: 2020-06-29

## 2018-10-22 ENCOUNTER — OFFICE VISIT (OUTPATIENT)
Dept: FAMILY MEDICINE | Facility: CLINIC | Age: 68
End: 2018-10-22
Payer: MEDICARE

## 2018-10-22 VITALS
HEIGHT: 72 IN | HEART RATE: 66 BPM | SYSTOLIC BLOOD PRESSURE: 139 MMHG | WEIGHT: 207.25 LBS | DIASTOLIC BLOOD PRESSURE: 50 MMHG | BODY MASS INDEX: 28.07 KG/M2

## 2018-10-22 DIAGNOSIS — M75.81 ROTATOR CUFF TENDINITIS, RIGHT: Primary | ICD-10-CM

## 2018-10-22 DIAGNOSIS — G56.01 CARPAL TUNNEL SYNDROME OF RIGHT WRIST: ICD-10-CM

## 2018-10-22 DIAGNOSIS — E78.2 MIXED HYPERLIPIDEMIA: ICD-10-CM

## 2018-10-22 PROCEDURE — 20610 DRAIN/INJ JOINT/BURSA W/O US: CPT | Mod: S$PBB,RT,, | Performed by: INTERNAL MEDICINE

## 2018-10-22 PROCEDURE — 1101F PT FALLS ASSESS-DOCD LE1/YR: CPT | Mod: CPTII,,, | Performed by: INTERNAL MEDICINE

## 2018-10-22 PROCEDURE — 3078F DIAST BP <80 MM HG: CPT | Mod: CPTII,,, | Performed by: INTERNAL MEDICINE

## 2018-10-22 PROCEDURE — 99213 OFFICE O/P EST LOW 20 MIN: CPT | Mod: PBBFAC,PN | Performed by: INTERNAL MEDICINE

## 2018-10-22 PROCEDURE — 3075F SYST BP GE 130 - 139MM HG: CPT | Mod: CPTII,,, | Performed by: INTERNAL MEDICINE

## 2018-10-22 PROCEDURE — 99213 OFFICE O/P EST LOW 20 MIN: CPT | Mod: 25,S$PBB,, | Performed by: INTERNAL MEDICINE

## 2018-10-22 PROCEDURE — 20610 DRAIN/INJ JOINT/BURSA W/O US: CPT | Mod: PBBFAC,PN,RT | Performed by: INTERNAL MEDICINE

## 2018-10-22 PROCEDURE — 99999 PR PBB SHADOW E&M-EST. PATIENT-LVL III: CPT | Mod: PBBFAC,,, | Performed by: INTERNAL MEDICINE

## 2018-10-22 RX ORDER — NAPROXEN 500 MG/1
500 TABLET ORAL 2 TIMES DAILY WITH MEALS
COMMUNITY
End: 2018-11-14

## 2018-10-22 RX ORDER — HYDROCODONE BITARTRATE AND ACETAMINOPHEN 10; 325 MG/1; MG/1
1 TABLET ORAL 2 TIMES DAILY PRN
Qty: 40 TABLET | Refills: 0 | Status: SHIPPED | OUTPATIENT
Start: 2018-10-22 | End: 2018-11-14

## 2018-10-22 RX ORDER — TRIAMCINOLONE ACETONIDE 40 MG/ML
40 INJECTION, SUSPENSION INTRA-ARTICULAR; INTRAMUSCULAR
Status: COMPLETED | OUTPATIENT
Start: 2018-10-22 | End: 2018-10-22

## 2018-10-22 RX ADMIN — TRIAMCINOLONE ACETONIDE 40 MG: 40 INJECTION, SUSPENSION INTRA-ARTICULAR; INTRAMUSCULAR at 03:10

## 2018-10-22 NOTE — PROGRESS NOTES
Assessment and Plan:    1. Rotator cuff tendinitis, right  Completed shoulder steroid injection today    Pre-Op Diagnosis: rotator cuff tendinitis  Post-Op Diagnosis: Same  Procedure: steroid injection  Performing Physician: Lucila Inman  Laterality: right    Injection  3cc of 1% lidocaine  Lot#[id number]   Exp Date 04/22  40 mg triamcinolone  Lot# [id number]  Exp Date 01/20    Consent: Prior to the procedure, the risks including bleeding, infection, tendon rupture, and nerve damage as well as the benefits of the procedure including improvement in pain and function were explained to the patient and verbal consent was obtained.    Procedure: A time-out was performed prior to initiating procedure to be sure of right patient and right location. The area was prepped with alcohol swab x 2 in the usual sterile manner. The needle was inserted into the affected area and the steroid was injected.  There were no complications during this procedure.    Follow up: The patient tolerated the procedure well without complications.  Standard post-procedure care was explained and return precautions were given.    - HYDROcodone-acetaminophen (LORTAB )  mg per tablet; Take 1 tablet by mouth 2 (two) times daily as needed.  Dispense: 40 tablet; Refill: 0  - triamcinolone acetonide injection 40 mg    2. Carpal tunnel syndrome of right wrist  Discussed use of wrist brace and follow up with ortho, I do not do wrist injections.     3. Mixed hyperlipidemia  Not controlled, however patient has been intolerant of 3 different statins and zetia before. He has a history of CAD and continues to smoke with no plans to quit.   - Lipid panel; Future  - Comprehensive metabolic panel; Future    ______________________________________________________________________  Subjective:    Chief Complaint:  Follow up shoulder and hand/wrist pain    HPI:  Al is a 68 y.o. year old man here for follow up of shoulder pain and hand/wrist pain.     He had  seen Dr. Wolfe in the past about this and has been diagnosed with left sided shoulder rotator cuff tendinitis with possible cuff tear, and also right sided carpal tunnel syndrome.     He notes that the right shoulder and the right CTS are bothering him the most today. He notes that he has had the lortabs in the past to use rarely, and has also had great benefit from the injections in the past.     Medications:  Current Outpatient Medications on File Prior to Visit   Medication Sig Dispense Refill    clopidogrel (PLAVIX) 75 mg tablet TAKE 1 TABLET BY MOUTH EVERY DAY 30 tablet 0    naproxen (NAPROSYN) 500 MG tablet Take 500 mg by mouth 2 (two) times daily with meals.      traZODone (DESYREL) 100 MG tablet TAKE 1 TABLET(100 MG) BY MOUTH EVERY EVENING (Patient taking differently: TAKE 1 TABLET(100 MG) BY MOUTH EVERY EVENING PRN) 90 tablet 3    nitroGLYCERIN (NITROSTAT) 0.4 MG SL tablet Place 0.4 mg under the tongue every 5 (five) minutes as needed.        [DISCONTINUED] ezetimibe (ZETIA) 10 mg tablet Take 1 tablet (10 mg total) by mouth once daily. 90 tablet 3    [DISCONTINUED] hydrocodone-acetaminophen 10-325mg (LORTAB )  mg Tab Take 1 tablet by mouth 2 (two) times daily as needed. 40 tablet 0    [DISCONTINUED] indomethacin (INDOCIN) 25 MG capsule TAKE 1 CAPSULE BY MOUTH THREE TIMES DAILY AS NEEDED 60 capsule 0     No current facility-administered medications on file prior to visit.        Review of Systems:  Review of Systems   Constitutional: Negative for activity change and appetite change.   Respiratory: Negative for shortness of breath and wheezing.    Cardiovascular: Negative for chest pain and palpitations.   Musculoskeletal: Positive for arthralgias.   Neurological: Positive for numbness (in right hand). Negative for seizures, syncope and weakness.       Past Medical History:  Past Medical History:   Diagnosis Date    Anticoagulant long-term use     ASA 81 mg, Plavix    Arthritis      Knees and Hands    Cancer     Skin, face    Coronary artery disease     with 2 stents    Hyperlipidemia     Melanoma     Squamous cell carcinoma        Objective:    Vitals:  Vitals:    10/22/18 1504   BP: (!) 139/50   Pulse: 66   Weight: 94 kg (207 lb 3.7 oz)   Height: 6' (1.829 m)   PainSc:   5   PainLoc: Wrist       Physical Exam   Constitutional: He is oriented to person, place, and time. He appears well-developed and well-nourished. No distress.   HENT:   Mouth/Throat: Oropharynx is clear and moist.   Eyes: Conjunctivae are normal.   Cardiovascular: Normal rate and regular rhythm.   Pulmonary/Chest: Effort normal. No respiratory distress.   Musculoskeletal:   right shoulder has limited abduction 2/2 pain, no joint swelling or deformity appreciated on exam   Neurological: He is alert and oriented to person, place, and time.   Skin: Skin is warm and dry.   Psychiatric: He has a normal mood and affect. His behavior is normal.   Vitals reviewed.      Data:  Previous labs reviewed and pertinent for lipids uncontrolled.      Lucila Inman MD  Internal Medicine

## 2018-11-05 ENCOUNTER — TELEPHONE (OUTPATIENT)
Dept: FAMILY MEDICINE | Facility: CLINIC | Age: 68
End: 2018-11-05

## 2018-11-05 NOTE — TELEPHONE ENCOUNTER
appt with Dr. Wolfe to see if he thinks a repeat injection this soon is a good idea. We usually wait 2-3 months.

## 2018-11-05 NOTE — TELEPHONE ENCOUNTER
----- Message from Scott Arzola sent at 11/5/2018  1:59 PM CST -----  Contact: pt wife Jada Elias is wanting to schedule an injection in the shoulder for arthritis. They are requesting something this week to relieve the pain. Please call to advise.     Call Back #: [phone]   Thanks

## 2018-11-05 NOTE — TELEPHONE ENCOUNTER
Tried to reach pt. No answer, left msg to call back.    [facility] Per providers note.

## 2018-11-05 NOTE — TELEPHONE ENCOUNTER
I have attempted without success to contact this patient by phone to will try again later and I left a message on answering machine.

## 2018-11-06 NOTE — TELEPHONE ENCOUNTER
Spoke with spouse and she states that patient is wanting to have injection in the left shoulder. States that he is having the arthritis in that shoulder as well. States that this is not a repeat. Advised that patient be seen by Dr Wolfe but spouse wanted message sent back to PCP.

## 2018-11-07 ENCOUNTER — OFFICE VISIT (OUTPATIENT)
Dept: FAMILY MEDICINE | Facility: CLINIC | Age: 68
End: 2018-11-07
Payer: MEDICARE

## 2018-11-07 VITALS
SYSTOLIC BLOOD PRESSURE: 140 MMHG | HEIGHT: 72 IN | TEMPERATURE: 98 F | DIASTOLIC BLOOD PRESSURE: 80 MMHG | WEIGHT: 205.5 LBS | HEART RATE: 63 BPM | OXYGEN SATURATION: 95 % | BODY MASS INDEX: 27.83 KG/M2

## 2018-11-07 DIAGNOSIS — G56.01 CARPAL TUNNEL SYNDROME OF RIGHT WRIST: ICD-10-CM

## 2018-11-07 DIAGNOSIS — M19.012 BILATERAL SHOULDER REGION ARTHRITIS: ICD-10-CM

## 2018-11-07 DIAGNOSIS — M19.011 BILATERAL SHOULDER REGION ARTHRITIS: ICD-10-CM

## 2018-11-07 PROCEDURE — 3077F SYST BP >= 140 MM HG: CPT | Mod: CPTII,S$GLB,, | Performed by: FAMILY MEDICINE

## 2018-11-07 PROCEDURE — 20610 DRAIN/INJ JOINT/BURSA W/O US: CPT | Mod: 50,S$GLB,, | Performed by: FAMILY MEDICINE

## 2018-11-07 PROCEDURE — 1101F PT FALLS ASSESS-DOCD LE1/YR: CPT | Mod: CPTII,S$GLB,, | Performed by: FAMILY MEDICINE

## 2018-11-07 PROCEDURE — 99999 PR PBB SHADOW E&M-EST. PATIENT-LVL III: CPT | Mod: PBBFAC,,, | Performed by: FAMILY MEDICINE

## 2018-11-07 PROCEDURE — 3079F DIAST BP 80-89 MM HG: CPT | Mod: CPTII,S$GLB,, | Performed by: FAMILY MEDICINE

## 2018-11-07 PROCEDURE — 99213 OFFICE O/P EST LOW 20 MIN: CPT | Mod: 25,S$GLB,, | Performed by: FAMILY MEDICINE

## 2018-11-07 RX ORDER — TRIAMCINOLONE ACETONIDE 40 MG/ML
40 INJECTION, SUSPENSION INTRA-ARTICULAR; INTRAMUSCULAR
Status: DISCONTINUED | OUTPATIENT
Start: 2018-11-07 | End: 2018-11-07 | Stop reason: HOSPADM

## 2018-11-07 RX ADMIN — TRIAMCINOLONE ACETONIDE 40 MG: 40 INJECTION, SUSPENSION INTRA-ARTICULAR; INTRAMUSCULAR at 12:11

## 2018-11-07 NOTE — PROGRESS NOTES
Subjective:       Patient ID: Al Carter is a 68 y.o. male.    Chief Complaint: Shoulder Pain    He was seen last week by Dr. Inman with right shoulder pain. He had some response to a right subacromial injection.  He has a history of bilateral shoulder pain. His right shoulder pain dates back to childhood when he had a shoulder separation.  His left shoulder has been bothering him often on for at least 1 year.  It hurts worse with forward movement.  It is interfering with his.  He also has right carpal tunnel syndrome and had some relief with an injection in April.  It is acting up again and interfering with use of his right hand.  I reviewed previous shoulder x-ray reports which show some glenohumeral arthritis as well as acromioclavicular arthritis.      Review of Systems   Constitutional: Negative for fever and unexpected weight change.   Respiratory: Negative for shortness of breath.    Cardiovascular: Negative for chest pain.       Objective:     Blood pressure (!) 140/80, pulse 63, temperature 97.9 °F (36.6 °C), temperature source Oral, height 6' (1.829 m), weight 93.2 kg (205 lb 7.5 oz), SpO2 95 %.      Physical Exam   Constitutional: He appears well-developed and well-nourished. He appears distressed.   Neck:   His neck range of motion was okay for his age.   Cardiovascular: Normal rate and regular rhythm.   Pulmonary/Chest: Effort normal and breath sounds normal.   Musculoskeletal:   Right shoulder with fairly good extension.  Some pain with abduction.  2+ pain with cross reach and tender acromioclavicular joint.  Left shoulder with abduction only 70°.  Limited external rotation.  Rotator cuff strength is strong and nonpainful except for painful external rotation.  Cross reach causes pain.  Tender acromioclavicular joint as well as subacromial area.   Neurological: He is alert.   Right  strength is intact.  Sensation to light touch in the 1st 2nd and 3rd digits is slightly reduced.       Assessment:        1. Bilateral shoulder region arthritis    2. Carpal tunnel syndrome of right wrist        Plan:       We discussed options.  He is seeing Dr. Wolfe tomorrow for his carpal tunnel syndrome.  I injected his left subacromial space and bilateral acromioclavicular joints.  See below.

## 2018-11-07 NOTE — PROCEDURES
Intermediate Joint Aspiration/Injection: R acromioclavicular, L acromioclavicular  Date/Time: 11/7/2018 12:41 PM  Performed by: Cj Guzmán MD  Authorized by: Cj Guzmán MD     Consent Done?:  Yes (Verbal)  Indications:  Pain    Location:  Shoulder  Site:  R acromioclavicular and L acromioclavicular  Needle size:  25 G  Approach:  Posterolateral  Medications:  40 mg triamcinolone acetonide 40 mg/mL     Additional Comments: Injected left subacromial space with 1 cc kenalog and 4 cc marcaine.  I also injected each acromioclavicular joint with 1 cc of solution containing 40 mg triamcinolone and 1 cc of Marcaine.  betadyne prep and sterile gloves.

## 2018-11-08 ENCOUNTER — OFFICE VISIT (OUTPATIENT)
Dept: ORTHOPEDICS | Facility: CLINIC | Age: 68
End: 2018-11-08
Payer: MEDICARE

## 2018-11-08 ENCOUNTER — HOSPITAL ENCOUNTER (OUTPATIENT)
Dept: RADIOLOGY | Facility: HOSPITAL | Age: 68
Discharge: HOME | End: 2018-11-08
Attending: ORTHOPAEDIC SURGERY
Payer: MEDICARE

## 2018-11-08 VITALS — HEIGHT: 72 IN | BODY MASS INDEX: 27.98 KG/M2 | WEIGHT: 206.56 LBS

## 2018-11-08 DIAGNOSIS — M79.641 RIGHT HAND PAIN: ICD-10-CM

## 2018-11-08 DIAGNOSIS — R20.2 NUMBNESS AND TINGLING OF RIGHT HAND: ICD-10-CM

## 2018-11-08 DIAGNOSIS — G56.01 CARPAL TUNNEL SYNDROME OF RIGHT WRIST: Primary | ICD-10-CM

## 2018-11-08 DIAGNOSIS — M79.641 RIGHT HAND PAIN: Primary | ICD-10-CM

## 2018-11-08 DIAGNOSIS — R20.0 NUMBNESS AND TINGLING OF RIGHT HAND: ICD-10-CM

## 2018-11-08 PROCEDURE — 73110 X-RAY EXAM OF WRIST: CPT | Mod: 26,RT,, | Performed by: RADIOLOGY

## 2018-11-08 PROCEDURE — 73110 X-RAY EXAM OF WRIST: CPT | Mod: TC,PO,RT

## 2018-11-08 PROCEDURE — 73130 X-RAY EXAM OF HAND: CPT | Mod: 26,RT,, | Performed by: RADIOLOGY

## 2018-11-08 PROCEDURE — 73130 X-RAY EXAM OF HAND: CPT | Mod: TC,PO,RT

## 2018-11-08 PROCEDURE — 20526 THER INJECTION CARP TUNNEL: CPT | Mod: RT,S$GLB,, | Performed by: ORTHOPAEDIC SURGERY

## 2018-11-08 PROCEDURE — 99999 PR PBB SHADOW E&M-EST. PATIENT-LVL II: CPT | Mod: PBBFAC,,, | Performed by: ORTHOPAEDIC SURGERY

## 2018-11-08 PROCEDURE — 1101F PT FALLS ASSESS-DOCD LE1/YR: CPT | Mod: CPTII,S$GLB,, | Performed by: ORTHOPAEDIC SURGERY

## 2018-11-08 PROCEDURE — 99214 OFFICE O/P EST MOD 30 MIN: CPT | Mod: 25,S$GLB,, | Performed by: ORTHOPAEDIC SURGERY

## 2018-11-08 RX ORDER — TRIAMCINOLONE ACETONIDE 40 MG/ML
40 INJECTION, SUSPENSION INTRA-ARTICULAR; INTRAMUSCULAR
Status: DISCONTINUED | OUTPATIENT
Start: 2018-11-08 | End: 2018-11-08 | Stop reason: HOSPADM

## 2018-11-08 RX ADMIN — TRIAMCINOLONE ACETONIDE 40 MG: 40 INJECTION, SUSPENSION INTRA-ARTICULAR; INTRAMUSCULAR at 01:11

## 2018-11-08 NOTE — PROGRESS NOTES
A 68 years old, recurrent signs and symptoms of carpal tunnel syndrome.  About   six months ago, we had given him an injection into his wrist, which he has   responded well to.  He is requesting to have this done again today.    Exam shows positive Tinel's, positive Phalen's.  No signs of infection or   instability.    ASSESSMENT:  Carpal tunnel syndrome, right wrist.    PLAN:  Kenalog injection to the right wrist.  We discussed with him surgical   release.  Follow up as needed.      PBB/HN  dd: 11/08/2018 14:08:45 (CST)  td: 11/09/2018 05:42:09 (CST)  Doc ID   #[id number]  Job ID #[id number]    CC:     Further History  Aching pain  Worse with activity  Relieved with rest  No other associated symptoms  No other radiation    Further Exam  Alert and oriented  Pleasant  Contralateral limb has appropriate range of motion for age and condition  Contralateral limb has appropriate strength for age and condition  Contralateral limb has appropriate stability  for age and condition  No adenopathy  Pulses are appropriate for current condition  Skin is intact        Chief Complaint    Chief Complaint   Patient presents with    Right Wrist - Pain, Numbness       HPI  Al Carter is a 68 y.o.  male who presents with       Past Medical History  Past Medical History:   Diagnosis Date    Anticoagulant long-term use     ASA 81 mg, Plavix    Arthritis     Knees and Hands    Cancer     Skin, face    Coronary artery disease     with 2 stents    Hyperlipidemia     Melanoma     Squamous cell carcinoma        Past Surgical History  Past Surgical History:   Procedure Laterality Date    BACK SURGERY  1976, 1981    L5 Laminectomy, X 2    CARDIAC CATHETERIZATION  10/2011    1 stent LAD    CARDIAC SURGERY      COLONOSCOPY      EXCISION-CARCINOMA face Right 9/28/2015    [facility]    JOINT REPLACEMENT Right     Knee, total    KNEE ARTHROSCOPY Left        Medications  Current Outpatient Medications    Medication Sig    clopidogrel (PLAVIX) 75 mg tablet TAKE 1 TABLET BY MOUTH EVERY DAY    HYDROcodone-acetaminophen (LORTAB )  mg per tablet Take 1 tablet by mouth 2 (two) times daily as needed.    naproxen (NAPROSYN) 500 MG tablet Take 500 mg by mouth 2 (two) times daily with meals.    nitroGLYCERIN (NITROSTAT) 0.4 MG SL tablet Place 0.4 mg under the tongue every 5 (five) minutes as needed.      traZODone (DESYREL) 100 MG tablet TAKE 1 TABLET(100 MG) BY MOUTH EVERY EVENING (Patient taking differently: TAKE 1 TABLET(100 MG) BY MOUTH EVERY EVENING PRN)     No current facility-administered medications for this visit.        Allergies  Review of patient's allergies indicates:   Allergen Reactions    Crestor [rosuvastatin]     Lipitor [atorvastatin]     Statins-hmg-coa reductase inhibitors      Weakness and muscle pain       Family History  Family History   Problem Relation Age of Onset    Hypertension Sister     Diabetes Neg Hx        Social History  Social History     Socioeconomic History    Marital status:      Spouse name: Not on file    Number of children: Not on file    Years of education: Not on file    Highest education level: Not on file   Social Needs    Financial resource strain: Not on file    Food insecurity - worry: Not on file    Food insecurity - inability: Not on file    Transportation needs - medical: Not on file    Transportation needs - non-medical: Not on file   Occupational History    Not on file   Tobacco Use    Smoking status: Current Some Day Smoker     Packs/day: 0.25     Types: Cigarettes     Start date: 9/24/1964    Smokeless tobacco: Never Used    Tobacco comment: Smoked 3 packs at most, now down to 6-7 per day   Substance and Sexual Activity    Alcohol use: Yes     Alcohol/week: 0.0 oz     Comment: rare    Drug use: No    Sexual activity: Not on file   Other Topics Concern    Not on file   Social History Narrative    Not on file                Review of Systems     Constitutional: Negative    HENT: Negative  Eyes: Negative  Respiratory: Negative  Cardiovascular: Negative  Musculoskeletal: HPI  Skin: Negative  Neurological: Negative  Hematological: Negative  Endocrine: Negative                 Physical Exam    There were no vitals filed for this visit.  Body mass index is 28.02 kg/m².  Physical Examination:     General appearance -  well appearing, and in no distress  Mental status - awake  Neck - supple  Chest -  symmetric air entry  Heart - normal rate   Abdomen - soft      Assessment     1. Carpal tunnel syndrome of right wrist    2. Right hand pain    3. Numbness and tingling of right hand      Procedure Note:    After obtaining consent and giving kenalog information and Krames post-injection information, the injection site was prepped in a sterile fashion. The right wrist was  injected with 20 mg kenalog and .5 cc of 1% lidocaine using an ulnar sided approach.  No medication was wasted.  Patient tolerated the injection well. Patient was instructed to implement a period of relative rest for 24hrs and report any adverse reactions.       Plan

## 2018-11-08 NOTE — PROCEDURES
Carpal Tunnel  Date/Time: 11/8/2018 1:47 PM  Performed by: Joe Wolfe MD  Authorized by: Joe Wolfe MD     Consent Done?: Yes (Verbal)  Indications: Pain    Location:  Wrist  Ultrasonic Guidance for needle placement: No  Needle size:  25 G  Medications:  40 mg triamcinolone acetonide 40 mg/mL  Patient tolerance:  Patient tolerated the procedure well with no immediate complications

## 2018-11-14 ENCOUNTER — OFFICE VISIT (OUTPATIENT)
Dept: FAMILY MEDICINE | Facility: CLINIC | Age: 68
End: 2018-11-14
Payer: MEDICARE

## 2018-11-14 VITALS
HEIGHT: 72 IN | SYSTOLIC BLOOD PRESSURE: 124 MMHG | HEART RATE: 60 BPM | DIASTOLIC BLOOD PRESSURE: 68 MMHG | WEIGHT: 201.63 LBS | TEMPERATURE: 98 F | BODY MASS INDEX: 27.31 KG/M2

## 2018-11-14 DIAGNOSIS — R20.0 RIGHT LEG NUMBNESS: ICD-10-CM

## 2018-11-14 DIAGNOSIS — M54.16 LUMBAR RADICULOPATHY: ICD-10-CM

## 2018-11-14 DIAGNOSIS — R29.898 RIGHT LEG WEAKNESS: Primary | ICD-10-CM

## 2018-11-14 DIAGNOSIS — I10 ESSENTIAL HYPERTENSION: ICD-10-CM

## 2018-11-14 PROCEDURE — 99999 PR PBB SHADOW E&M-EST. PATIENT-LVL III: CPT | Mod: PBBFAC,,, | Performed by: FAMILY MEDICINE

## 2018-11-14 PROCEDURE — 3078F DIAST BP <80 MM HG: CPT | Mod: CPTII,S$GLB,, | Performed by: FAMILY MEDICINE

## 2018-11-14 PROCEDURE — 3074F SYST BP LT 130 MM HG: CPT | Mod: CPTII,S$GLB,, | Performed by: FAMILY MEDICINE

## 2018-11-14 PROCEDURE — 99214 OFFICE O/P EST MOD 30 MIN: CPT | Mod: S$GLB,,, | Performed by: FAMILY MEDICINE

## 2018-11-14 PROCEDURE — 1101F PT FALLS ASSESS-DOCD LE1/YR: CPT | Mod: CPTII,S$GLB,, | Performed by: FAMILY MEDICINE

## 2018-11-14 NOTE — PROGRESS NOTES
Subjective:       Patient ID: Al Carter is a 68 y.o. male.    Chief Complaint: Numbness (Numbness RLE from knee down x about 1 yr, w/ progressing weakness.)    He complains of numbness and weakness of his right lower leg for the past year.  It has been getting worse the past 2 or 3 months and interferes with his walking.  His past history includes 2 lumbar disc surgeries at the L5 level.  1976 and 1981.  The 1981 surgery was for right-sided sciatica.  His pain and weakness resolved but he was left with some persistent numbness on the sole of his right foot.  He is not complaining of back pain or gluteal pain. He had a right knee replacement in 2012, but Dr. Wolfe does not think that that would be causing his right leg numbness.  I injected his shoulders last week and they have improved.      Review of Systems   Constitutional: Negative for fever and unexpected weight change.   Respiratory: Positive for cough. Negative for shortness of breath.    Musculoskeletal: Negative for back pain.   Neurological: Positive for weakness and numbness.       Objective:     Blood pressure 124/68, pulse 60, temperature 97.9 °F (36.6 °C), temperature source Oral, height 6' (1.829 m), weight 91.4 kg (201 lb 9.8 oz).      Physical Exam   Constitutional: He appears well-developed and well-nourished. No distress.   His gait is relatively symmetric.  I thought I detected a slight right foot drop that was intermittent.   Musculoskeletal:   He has definite weakness in his right ankle dorsiflexion and some weakness in his right ankle plantar flexion.  Right EHL is very weak.  Sensation to light touch is decreased and monofilament is absent on the right lateral calf.  Absent monofilament sensation on the plantar toes and arch.  Absent monofilament sensation on the dorsal lateral right foot  His strength stepping up onto the step with his right leg seems equal to the left leg.  He is much weaker rising up on his right toe.  He is unable  to raise his right toe off the ground while standing on his heels   Neurological: He is alert.   Left patellar reflex 2+ right 1+.  That may be altered by his previous knee surgery.  Achilles reflex is absent bilaterally.       Assessment:       1. Right leg weakness    2. Right leg numbness    3. Essential hypertension    4. Lumbar radiculopathy        Plan:       His symptoms may be related to lumbar radiculopathy that has recurred.  He may also have a more peripheral nerve compression.  Lumbar MRI scheduled.  I will refer to the appropriate specialist afterwards.

## 2018-11-19 RX ORDER — TRAZODONE HYDROCHLORIDE 100 MG/1
TABLET ORAL
Qty: 90 TABLET | Refills: 3 | Status: SHIPPED | OUTPATIENT
Start: 2018-11-19 | End: 2019-11-22 | Stop reason: SDUPTHER

## 2018-11-21 ENCOUNTER — HOSPITAL ENCOUNTER (OUTPATIENT)
Dept: RADIOLOGY | Facility: HOSPITAL | Age: 68
Discharge: HOME | End: 2018-11-21
Attending: FAMILY MEDICINE
Payer: MEDICARE

## 2018-11-21 DIAGNOSIS — M54.16 LUMBAR RADICULOPATHY: ICD-10-CM

## 2018-11-21 PROCEDURE — 72148 MRI LUMBAR SPINE W/O DYE: CPT | Mod: 26,,, | Performed by: RADIOLOGY

## 2018-11-21 PROCEDURE — 72148 MRI LUMBAR SPINE W/O DYE: CPT | Mod: TC,PO

## 2018-11-23 ENCOUNTER — TELEPHONE (OUTPATIENT)
Dept: FAMILY MEDICINE | Facility: CLINIC | Age: 68
End: 2018-11-23

## 2018-11-23 NOTE — TELEPHONE ENCOUNTER
----- Message from Gabriel Nunes sent at 11/23/2018  2:13 PM CST -----  Contact: patient  Type:  Patient Returning Call    Who Called:  patient  Who Left Message for Patient:    Does the patient know what this is regarding?: not sure  Best Call Back Number:  [phone]  Additional Information:  Requesting a call back from ,place call to pod

## 2018-11-24 ENCOUNTER — TELEPHONE (OUTPATIENT)
Dept: FAMILY MEDICINE | Facility: CLINIC | Age: 68
End: 2018-11-24

## 2018-11-24 DIAGNOSIS — M54.16 LUMBAR RADICULOPATHY: Primary | ICD-10-CM

## 2018-11-24 NOTE — TELEPHONE ENCOUNTER
----- Message from Rdaha Meza sent at 11/23/2018  4:37 PM CST -----  Contact: pt  Pt is returning nurse Kiara phone call regarding MRI. Having trouble with his phone called by the  on Wed unable to get response back and the nurse called him today so he would like a response as soon as possible ...[phone] or [phone]

## 2018-11-24 NOTE — TELEPHONE ENCOUNTER
----- Message from Candy Junior sent at 11/23/2018  4:53 PM CST -----  Contact: Patient  Type:  Patient Returning Call    Who Called:  Patient  Who Left Message for Patient:  Kiara  Does the patient know what this is regarding?:  MRI Results  Best Call Back Number:  [phone]  Additional Information:  Patient advised that his phone is not ringing when we call him and it is going to voicemail; please dial his phone multiple times to ensure he is able to answer. If he doesn't answer, please call his wife's cell phone at [phone].

## 2018-11-26 NOTE — TELEPHONE ENCOUNTER
Spoke w/ pt. Unable to book appt w/ Dr Joseph. Phone number given. Pt will call to sched own appt.

## 2018-12-03 ENCOUNTER — INITIAL CONSULT (OUTPATIENT)
Dept: NEUROSURGERY | Facility: CLINIC | Age: 68
End: 2018-12-03
Payer: MEDICARE

## 2018-12-03 VITALS
SYSTOLIC BLOOD PRESSURE: 118 MMHG | DIASTOLIC BLOOD PRESSURE: 60 MMHG | HEART RATE: 59 BPM | BODY MASS INDEX: 26.82 KG/M2 | HEIGHT: 72 IN | WEIGHT: 198 LBS

## 2018-12-03 DIAGNOSIS — M54.16 LUMBAR RADICULOPATHY: ICD-10-CM

## 2018-12-03 PROCEDURE — 3074F SYST BP LT 130 MM HG: CPT | Mod: CPTII,S$GLB,, | Performed by: NEUROLOGICAL SURGERY

## 2018-12-03 PROCEDURE — 3078F DIAST BP <80 MM HG: CPT | Mod: CPTII,S$GLB,, | Performed by: NEUROLOGICAL SURGERY

## 2018-12-03 PROCEDURE — 99205 OFFICE O/P NEW HI 60 MIN: CPT | Mod: S$GLB,,, | Performed by: NEUROLOGICAL SURGERY

## 2018-12-03 PROCEDURE — 1101F PT FALLS ASSESS-DOCD LE1/YR: CPT | Mod: CPTII,S$GLB,, | Performed by: NEUROLOGICAL SURGERY

## 2018-12-03 PROCEDURE — 99999 PR PBB SHADOW E&M-EST. PATIENT-LVL III: CPT | Mod: PBBFAC,,, | Performed by: NEUROLOGICAL SURGERY

## 2018-12-03 NOTE — PROGRESS NOTES
Neurosurgery History and Physical    Patient ID: Al Carter is a 68 y.o. male.    Chief Complaint   Patient presents with    Lumbar Spine Pain (L-Spine)     pt states he noticed around 2-3 months ago a numbness and tingling in RLE; denies pain; denies bowel and bladder issues; PHQ 2 Oswestry 6%       Review of Systems   Constitutional: Negative for activity change.   HENT: Negative.    Eyes: Negative.    Respiratory: Negative.    Cardiovascular: Negative.    Gastrointestinal: Negative.    Endocrine: Negative.    Genitourinary: Negative.    Musculoskeletal: Negative for back pain.   Skin: Negative.    Allergic/Immunologic: Negative.    Neurological: Positive for weakness and numbness.   Hematological: Negative.    Psychiatric/Behavioral: Negative.        Past Medical History:   Diagnosis Date    Anticoagulant long-term use     ASA 81 mg, Plavix    Arthritis     Knees and Hands    Cancer     Skin, face    Coronary artery disease     with 2 stents    Hyperlipidemia     Melanoma     Squamous cell carcinoma      Social History     Socioeconomic History    Marital status:      Spouse name: Not on file    Number of children: Not on file    Years of education: Not on file    Highest education level: Not on file   Social Needs    Financial resource strain: Not on file    Food insecurity - worry: Not on file    Food insecurity - inability: Not on file    Transportation needs - medical: Not on file    Transportation needs - non-medical: Not on file   Occupational History    Not on file   Tobacco Use    Smoking status: Current Some Day Smoker     Packs/day: 0.25     Types: Cigarettes     Start date: 9/24/1964    Smokeless tobacco: Never Used    Tobacco comment: Smoked 3 packs at most, now down to 6-7 per day   Substance and Sexual Activity    Alcohol use: Yes     Alcohol/week: 0.0 oz     Comment: rare    Drug use: No    Sexual activity: Not on file   Other Topics Concern    Not on file    Social History Narrative    Not on file     Family History   Problem Relation Age of Onset    Hypertension Sister     Diabetes Neg Hx      Review of patient's allergies indicates:   Allergen Reactions    Crestor [rosuvastatin]     Lipitor [atorvastatin]     Statins-hmg-coa reductase inhibitors      Weakness and muscle pain       Current Outpatient Medications:     clopidogrel (PLAVIX) 75 mg tablet, TAKE 1 TABLET BY MOUTH EVERY DAY, Disp: 30 tablet, Rfl: 0    nitroGLYCERIN (NITROSTAT) 0.4 MG SL tablet, Place 0.4 mg under the tongue every 5 (five) minutes as needed.  , Disp: , Rfl:     traZODone (DESYREL) 100 MG tablet, TAKE 1 TABLET(100 MG) BY MOUTH EVERY EVENING, Disp: 90 tablet, Rfl: 3  Blood pressure 118/60, pulse (!) 59, height 6' (1.829 m), weight 89.8 kg (197 lb 15.6 oz).      Neurologic Exam     Mental Status   Oriented to person, place, and time.   Attention: normal. Concentration: normal.   Speech: speech is normal   Level of consciousness: alert  Knowledge: good.     Cranial Nerves     CN II   Visual acuity: normal    CN III, IV, VI   Pupils are equal, round, and reactive to light.  Extraocular motions are normal.     CN V   Facial sensation intact.     CN VII   Facial expression full, symmetric.     CN VIII   Hearing: intact    CN IX, X   Palate: symmetric    CN XI   CN XI normal.     CN XII   CN XII normal.     Motor Exam   Muscle bulk: normal  Overall muscle tone: normal  Right arm pronator drift: absent  Left arm pronator drift: absent    Strength   Right deltoid: 5/5  Left deltoid: 5/5  Right biceps: 5/5  Left biceps: 5/5  Right triceps: 5/5  Left triceps: 5/5  Right wrist flexion: 5/5  Left wrist flexion: 5/5  Right wrist extension: 5/5  Left wrist extension: 5/5  Right interossei: 5/5  Left interossei: 5/5  Right iliopsoas: 5/5  Left iliopsoas: 5/5  Right quadriceps: 5/5  Left quadriceps: 5/5  Right hamstrin/5  Left hamstrin/5  Right anterior tibial: 5/5  Left anterior tibial:  5/5  Right posterior tibial: 5/5  Left posterior tibial: 5/5  Right peroneal: 4/5  Left peroneal: 5/5  Right gastroc: 5/5  Left gastroc: 5/5    Sensory Exam   Right arm light touch: normal  Left arm light touch: normal  Left leg light touch: normal  Sensory deficit distribution on right: L5    Gait, Coordination, and Reflexes     Gait  Gait: normal    Coordination   Romberg: negative  Finger to nose coordination: normal    Tremor   Resting tremor: absent    Reflexes   Right brachioradialis: 0  Left brachioradialis: 0  Right biceps: 1+  Left biceps: 1+  Right triceps: 0  Left triceps: 0  Right patellar: 0  Left patellar: 1+  Right achilles: 0  Left achilles: 0  Right plantar: normal  Left plantar: normal  Right Brower: absent  Left Brower: absent  Right ankle clonus: absent  Left ankle clonus: absent      Physical Exam   Constitutional: He is oriented to person, place, and time. He appears well-developed and well-nourished.   HENT:   Head: Normocephalic and atraumatic.   Eyes: EOM are normal. Pupils are equal, round, and reactive to light.   Neck: Normal range of motion. Neck supple.   Cardiovascular: Normal rate and regular rhythm.   Pulmonary/Chest: Effort normal.   Abdominal: Soft.   Musculoskeletal: Normal range of motion.   Neurological: He is alert and oriented to person, place, and time. He has a normal Finger-Nose-Finger Test and a normal Romberg Test. Gait normal.   Reflex Scores:       Tricep reflexes are 0 on the right side and 0 on the left side.       Bicep reflexes are 1+ on the right side and 1+ on the left side.       Brachioradialis reflexes are 0 on the right side and 0 on the left side.       Patellar reflexes are 0 on the right side and 1+ on the left side.       Achilles reflexes are 0 on the right side and 0 on the left side.  Skin: Skin is warm and dry.   Psychiatric: He has a normal mood and affect. His speech is normal and behavior is normal. Judgment and thought content normal.   Nursing  note and vitals reviewed.      Vital Signs  Pulse: (!) 59  BP: 118/60  Pain Score: 0-No pain  Height and Weight  Height: 6' (182.9 cm)  Weight: 89.8 kg (197 lb 15.6 oz)  BSA (Calculated - sq m): 2.14 sq meters  BMI (Calculated): 26.9  Weight in (lb) to have BMI = 25: 183.9]    Provider dictation:  I reviewed the imaging. He has evidence of prior right-sided surgery at L5-S1 with likely scarring around the right S1 nerve root. There is severe right L4-L5 lateral recess stenosis secondary to facet and ligamentous hypertrophy and a small disc herniation. There is no significant foraminal stenosis at L5-S1.    He has noted new ankle dorsiflexion weakness and paresthesias in the dorsum of his right foot for about 2 months. He has had numbness in the bottom of his foot for decades, since he had to undergo lumbar surgery in the 70s. He denies any traci back or leg pain.     On exam, he has 4/5 right foot dorsiflexion with equal foot eversion and inversion and numbness in an L5 distribution and S1 distribution (old).     In light of his progressive weakness referable to L5 nerve root compression at L4-L5, I have recommended a minimally invasive right L4-L5 laminectomy and possible microdiscectomy to decompress his right L5 nerve root. I have discussed the risks, benefits and alternatives to the proposed operation in detail. All questions were answered. Risks discussed include but are not limited to: bleeding, infection, spinal fluid leak, injury to the nerves, weakness, numbness, paralysis, loss of bowel or bladder function, injury to the blood vessels, stroke, heart attack, blood clots, destabilization, no improvement or worsening of symptoms, re-herniation, need for more surgery including fusion, death.     He would like to think about it and will call the clinic with his decision. I have offered him PT and SALENA but he declined. He understands that the longer the nerve is compressed, the less likely it is to recover.  He  would need PCP and Cardiology clearance for surgery.      Visit Diagnosis:  Lumbar radiculopathy

## 2018-12-03 NOTE — LETTER
December 3, 2018      Cj Guzmán MD  [address]  [facility] [address]           [place] [facility] Neurosurgery  [address] [facility] [address] [place] [address]  Phone: [phone]  Fax: [phone]          Patient: Al Carter   MR Number: [id number]   YOB: 1950   Date of Visit: 12/3/2018       Dear Dr. Cj Guzmán:    Thank you for referring Al Catrer to me for evaluation. Attached you will find relevant portions of my assessment and plan of care.    If you have questions, please do not hesitate to call me. I look forward to following Al Carter along with you.    Sincerely,    Xena Joseph MD    Enclosure  CC:  No Recipients    If you would like to receive this communication electronically, please contact [email or web address] or [phone] to request more information on [employer] Link access.    For providers and/or their staff who would like to refer a patient to [facility], please contact us through our one-stop-shop provider referral line, [facility], at [phone].    If you feel you have received this communication in error or would no longer like to receive these types of communications, please e-mail [email or web address]

## 2018-12-19 ENCOUNTER — TELEPHONE (OUTPATIENT)
Dept: CARDIOLOGY | Facility: CLINIC | Age: 68
End: 2018-12-19

## 2018-12-19 NOTE — TELEPHONE ENCOUNTER
----- Message from Radha Meza sent at 12/19/2018  1:56 PM CST -----  Contact: pt  Pt states that he needing to come in see the Dr as soon as possible,swelling where legs 5 inch above ankles,wife went on the Internet and said sign of a upcoming heart attack..[phone] (home)

## 2018-12-19 NOTE — TELEPHONE ENCOUNTER
Please advise: pt. Notice swelling legs as less 0.5 inch started about two months ago, with fatigued.

## 2018-12-20 ENCOUNTER — TELEPHONE (OUTPATIENT)
Dept: CARDIOLOGY | Facility: CLINIC | Age: 68
End: 2018-12-20

## 2018-12-20 NOTE — TELEPHONE ENCOUNTER
Scheduled patient echo for 12/24 at 10:45am. Scheduled appt with Dr. Calix for 1/10/19 at 9:15am. Patient aware of appt dates and times.

## 2018-12-24 ENCOUNTER — CLINICAL SUPPORT (OUTPATIENT)
Dept: CARDIOLOGY | Facility: CLINIC | Age: 68
End: 2018-12-24
Attending: INTERNAL MEDICINE
Payer: MEDICARE

## 2018-12-24 VITALS
SYSTOLIC BLOOD PRESSURE: 162 MMHG | BODY MASS INDEX: 26.68 KG/M2 | DIASTOLIC BLOOD PRESSURE: 80 MMHG | WEIGHT: 197 LBS | HEART RATE: 58 BPM | HEIGHT: 72 IN

## 2018-12-24 DIAGNOSIS — I10 ESSENTIAL HYPERTENSION: ICD-10-CM

## 2018-12-24 DIAGNOSIS — E78.2 MIXED HYPERLIPIDEMIA: ICD-10-CM

## 2018-12-24 DIAGNOSIS — I25.9 ISCHEMIC HEART DISEASE DUE TO CORONARY ARTERY OBSTRUCTION: ICD-10-CM

## 2018-12-24 DIAGNOSIS — I24.0 ISCHEMIC HEART DISEASE DUE TO CORONARY ARTERY OBSTRUCTION: ICD-10-CM

## 2018-12-24 PROCEDURE — 93306 TTE W/DOPPLER COMPLETE: CPT | Mod: S$GLB,,, | Performed by: INTERNAL MEDICINE

## 2018-12-24 PROCEDURE — 99999 PR PBB SHADOW E&M-EST. PATIENT-LVL II: CPT | Mod: PBBFAC,,,

## 2018-12-26 LAB
ASCENDING AORTA: 3.05 CM
AV INDEX (PROSTH): 0.66
AV MEAN GRADIENT: 4.8 MMHG
AV PEAK GRADIENT: 9 MMHG
AV VALVE AREA: 2.06 CM2
BSA FOR ECHO PROCEDURE: 2.13 M2
CV ECHO LV RWT: 0.51 CM
DOP CALC AO PEAK VEL: 1.5 M/S
DOP CALC AO VTI: 36.73 CM
DOP CALC LVOT AREA: 3.14 CM2
DOP CALC LVOT DIAMETER: 2 CM
DOP CALC LVOT STROKE VOLUME: 75.83 CM3
DOP CALCLVOT PEAK VEL VTI: 24.15 CM
E WAVE DECELERATION TIME: 266.63 MSEC
E/A RATIO: 1.16
E/E' RATIO: 17.07
ECHO LV POSTERIOR WALL: 1.18 CM (ref 0.6–1.1)
FRACTIONAL SHORTENING: 31 % (ref 28–44)
INTERVENTRICULAR SEPTUM: 1.25 CM (ref 0.6–1.1)
IVRT: 0.12 MSEC
LA MAJOR: 5.42 CM
LA MINOR: 5.91 CM
LA WIDTH: 4.05 CM
LEFT ATRIUM SIZE: 3.92 CM
LEFT ATRIUM VOLUME INDEX: 36 ML/M2
LEFT ATRIUM VOLUME: 76.3 CM3
LEFT INTERNAL DIMENSION IN SYSTOLE: 3.21 CM (ref 2.1–4)
LEFT VENTRICLE DIASTOLIC VOLUME INDEX: 46.65 ML/M2
LEFT VENTRICLE DIASTOLIC VOLUME: 98.74 ML
LEFT VENTRICLE MASS INDEX: 99.6 G/M2
LEFT VENTRICLE SYSTOLIC VOLUME INDEX: 19.5 ML/M2
LEFT VENTRICLE SYSTOLIC VOLUME: 41.34 ML
LEFT VENTRICULAR INTERNAL DIMENSION IN DIASTOLE: 4.63 CM (ref 3.5–6)
LEFT VENTRICULAR MASS: 210.8 G
LV LATERAL E/E' RATIO: 14.22
LV SEPTAL E/E' RATIO: 21.33
MV PEAK A VEL: 1.1 M/S
MV PEAK E VEL: 1.28 M/S
PISA TR MAX VEL: 2.89 M/S
PULM VEIN S/D RATIO: 1.35
PV PEAK D VEL: 0.54 M/S
PV PEAK S VEL: 0.73 M/S
RA MAJOR: 5.22 CM
RA PRESSURE: 3 MMHG
RA WIDTH: 3.37 CM
RIGHT VENTRICULAR END-DIASTOLIC DIMENSION: 4.09 CM
RV TISSUE DOPPLER FREE WALL SYSTOLIC VELOCITY 1 (APICAL 4 CHAMBER VIEW): 15.98 M/S
SINUS: 3.2 CM
STJ: 2.92 CM
TDI LATERAL: 0.09
TDI SEPTAL: 0.06
TDI: 0.08
TR MAX PG: 33.41 MMHG
TRICUSPID ANNULAR PLANE SYSTOLIC EXCURSION: 4.03 CM
TV REST PULMONARY ARTERY PRESSURE: 36 MMHG

## 2019-01-10 ENCOUNTER — OFFICE VISIT (OUTPATIENT)
Dept: CARDIOLOGY | Facility: CLINIC | Age: 69
End: 2019-01-10
Payer: MEDICARE

## 2019-01-10 VITALS
DIASTOLIC BLOOD PRESSURE: 80 MMHG | HEART RATE: 69 BPM | HEIGHT: 72 IN | SYSTOLIC BLOOD PRESSURE: 164 MMHG | WEIGHT: 199.94 LBS | BODY MASS INDEX: 27.08 KG/M2

## 2019-01-10 DIAGNOSIS — R00.2 PALPITATIONS: ICD-10-CM

## 2019-01-10 DIAGNOSIS — I10 ESSENTIAL HYPERTENSION: Primary | ICD-10-CM

## 2019-01-10 DIAGNOSIS — I25.9 ISCHEMIC HEART DISEASE DUE TO CORONARY ARTERY OBSTRUCTION: ICD-10-CM

## 2019-01-10 DIAGNOSIS — Z72.0 TOBACCO ABUSE: ICD-10-CM

## 2019-01-10 DIAGNOSIS — R22.43 LOCALIZED SWELLING OF BOTH LOWER LEGS: ICD-10-CM

## 2019-01-10 DIAGNOSIS — E78.2 MIXED HYPERLIPIDEMIA: ICD-10-CM

## 2019-01-10 DIAGNOSIS — I24.0 ISCHEMIC HEART DISEASE DUE TO CORONARY ARTERY OBSTRUCTION: ICD-10-CM

## 2019-01-10 PROCEDURE — 99999 PR PBB SHADOW E&M-EST. PATIENT-LVL III: ICD-10-PCS | Mod: PBBFAC,,, | Performed by: INTERNAL MEDICINE

## 2019-01-10 PROCEDURE — 99999 PR PBB SHADOW E&M-EST. PATIENT-LVL III: CPT | Mod: PBBFAC,,, | Performed by: INTERNAL MEDICINE

## 2019-01-10 PROCEDURE — 3077F PR MOST RECENT SYSTOLIC BLOOD PRESSURE >= 140 MM HG: ICD-10-PCS | Mod: CPTII,S$GLB,, | Performed by: INTERNAL MEDICINE

## 2019-01-10 PROCEDURE — 3079F PR MOST RECENT DIASTOLIC BLOOD PRESSURE 80-89 MM HG: ICD-10-PCS | Mod: CPTII,S$GLB,, | Performed by: INTERNAL MEDICINE

## 2019-01-10 PROCEDURE — 3077F SYST BP >= 140 MM HG: CPT | Mod: CPTII,S$GLB,, | Performed by: INTERNAL MEDICINE

## 2019-01-10 PROCEDURE — 1101F PT FALLS ASSESS-DOCD LE1/YR: CPT | Mod: CPTII,S$GLB,, | Performed by: INTERNAL MEDICINE

## 2019-01-10 PROCEDURE — 1101F PR PT FALLS ASSESS DOC 0-1 FALLS W/OUT INJ PAST YR: ICD-10-PCS | Mod: CPTII,S$GLB,, | Performed by: INTERNAL MEDICINE

## 2019-01-10 PROCEDURE — 3079F DIAST BP 80-89 MM HG: CPT | Mod: CPTII,S$GLB,, | Performed by: INTERNAL MEDICINE

## 2019-01-10 PROCEDURE — 99214 PR OFFICE/OUTPT VISIT, EST, LEVL IV, 30-39 MIN: ICD-10-PCS | Mod: S$GLB,,, | Performed by: INTERNAL MEDICINE

## 2019-01-10 PROCEDURE — 99214 OFFICE O/P EST MOD 30 MIN: CPT | Mod: S$GLB,,, | Performed by: INTERNAL MEDICINE

## 2019-01-10 NOTE — PROGRESS NOTES
"Subjective:    Patient ID:  Al Carter is a 68 y.o. male who presents for follow-up of CAD    HPI  He comes for follow up with swelling both legs, no chest pain  Occasional "slow pulse" with lightheadedness    Review of Systems   Constitution: Negative for decreased appetite, weakness, malaise/fatigue, weight gain and weight loss.   Cardiovascular: Positive for leg swelling and palpitations. Negative for chest pain, dyspnea on exertion and syncope.   Respiratory: Negative for cough and shortness of breath.    Gastrointestinal: Negative.    All other systems reviewed and are negative.       Objective:      Physical Exam   Constitutional: He is oriented to person, place, and time. He appears well-developed and well-nourished.   HENT:   Head: Normocephalic.   Eyes: Pupils are equal, round, and reactive to light.   Neck: Normal range of motion. Neck supple. No JVD present. Carotid bruit is not present. No thyromegaly present.   Cardiovascular: Normal rate, regular rhythm, normal heart sounds, intact distal pulses and normal pulses. PMI is not displaced. Exam reveals no gallop.   No murmur heard.  Pulmonary/Chest: Effort normal and breath sounds normal.   Abdominal: Soft. Normal appearance. He exhibits no mass. There is no hepatosplenomegaly. There is no tenderness.   Musculoskeletal: Normal range of motion. He exhibits edema (trace bilateral).   Neurological: He is alert and oriented to person, place, and time. He has normal strength and normal reflexes. No sensory deficit.   Skin: Skin is warm and intact.   Psychiatric: He has a normal mood and affect.   Nursing note and vitals reviewed.        Assessment:       1. Essential hypertension    2. Ischemic heart disease due to coronary artery obstruction    3. Mixed hyperlipidemia         Plan:   Stress echo, event monitor, venous doppler of legs  Call with results  6 week f/u        "

## 2019-01-22 ENCOUNTER — CLINICAL SUPPORT (OUTPATIENT)
Dept: CARDIOLOGY | Facility: CLINIC | Age: 69
End: 2019-01-22
Attending: INTERNAL MEDICINE
Payer: MEDICARE

## 2019-01-22 VITALS — WEIGHT: 199 LBS | BODY MASS INDEX: 26.95 KG/M2 | HEIGHT: 72 IN

## 2019-01-22 DIAGNOSIS — Z72.0 TOBACCO ABUSE: ICD-10-CM

## 2019-01-22 DIAGNOSIS — I10 ESSENTIAL HYPERTENSION: ICD-10-CM

## 2019-01-22 DIAGNOSIS — I24.0 ISCHEMIC HEART DISEASE DUE TO CORONARY ARTERY OBSTRUCTION: ICD-10-CM

## 2019-01-22 DIAGNOSIS — I25.9 ISCHEMIC HEART DISEASE DUE TO CORONARY ARTERY OBSTRUCTION: ICD-10-CM

## 2019-01-22 DIAGNOSIS — R22.43 LOCALIZED SWELLING OF BOTH LOWER LEGS: ICD-10-CM

## 2019-01-22 DIAGNOSIS — R00.2 PALPITATIONS: ICD-10-CM

## 2019-01-22 DIAGNOSIS — E78.2 MIXED HYPERLIPIDEMIA: ICD-10-CM

## 2019-01-22 PROCEDURE — 93351 STRESS TTE COMPLETE: CPT | Mod: S$GLB,,, | Performed by: INTERNAL MEDICINE

## 2019-01-22 PROCEDURE — 93268 ECG RECORD/REVIEW: CPT | Mod: S$GLB,,, | Performed by: INTERNAL MEDICINE

## 2019-01-22 PROCEDURE — 99999 PR PBB SHADOW E&M-EST. PATIENT-LVL I: CPT | Mod: PBBFAC,,,

## 2019-01-22 PROCEDURE — 93970 CV US DOPPLER VENOUS LEGS BILATERAL (CUPID ONLY): ICD-10-PCS | Mod: S$GLB,,, | Performed by: INTERNAL MEDICINE

## 2019-01-22 PROCEDURE — 99999 PR PBB SHADOW E&M-EST. PATIENT-LVL I: ICD-10-PCS | Mod: PBBFAC,,,

## 2019-01-22 PROCEDURE — 93351 ECHOCARDIOGRAM STRESS TEST (CUPID ONLY): ICD-10-PCS | Mod: S$GLB,,, | Performed by: INTERNAL MEDICINE

## 2019-01-22 PROCEDURE — 93970 EXTREMITY STUDY: CPT | Mod: S$GLB,,, | Performed by: INTERNAL MEDICINE

## 2019-01-22 PROCEDURE — 93268 CARDIAC EVENT MONITOR (CUPID ONLY): ICD-10-PCS | Mod: S$GLB,,, | Performed by: INTERNAL MEDICINE

## 2019-01-23 LAB
ASCENDING AORTA: 2.54 CM
BSA FOR ECHO PROCEDURE: 2.14 M2
CV ECHO LV RWT: 0.35 CM
CV STRESS BASE HR: 81
DIASTOLIC BLOOD PRESSURE: 68
DOP CALC LVOT AREA: 4.01 CM2
DOP CALC LVOT DIAMETER: 2.26 CM
DOP CALC LVOT PEAK VEL: 1.33 M/S
DOP CALC LVOT STROKE VOLUME: 113.19 CM3
DOP CALCLVOT PEAK VEL VTI: 28.23 CM
E WAVE DECELERATION TIME: 314.79 MSEC
E/A RATIO: 0.89
E/E' RATIO: 11.47
ECHO LV POSTERIOR WALL: 0.97 CM (ref 0.6–1.1)
FRACTIONAL SHORTENING: 40 % (ref 28–44)
INTERVENTRICULAR SEPTUM: 1.13 CM (ref 0.6–1.1)
IVRT: 0.1 MSEC
LA MAJOR: 4.32 CM
LA MINOR: 4.76 CM
LA WIDTH: 3.16 CM
LEFT ATRIUM SIZE: 3.07 CM
LEFT ATRIUM VOLUME INDEX: 17.6 ML/M2
LEFT ATRIUM VOLUME: 37.35 CM3
LEFT INTERNAL DIMENSION IN SYSTOLE: 3.35 CM (ref 2.1–4)
LEFT VENTRICLE DIASTOLIC VOLUME INDEX: 72.56 ML/M2
LEFT VENTRICLE DIASTOLIC VOLUME: 154.26 ML
LEFT VENTRICLE MASS INDEX: 110.6 G/M2
LEFT VENTRICLE SYSTOLIC VOLUME INDEX: 21.5 ML/M2
LEFT VENTRICLE SYSTOLIC VOLUME: 45.73 ML
LEFT VENTRICULAR INTERNAL DIMENSION IN DIASTOLE: 5.61 CM (ref 3.5–6)
LEFT VENTRICULAR MASS: 235.02 G
LV LATERAL E/E' RATIO: 9.91
LV SEPTAL E/E' RATIO: 13.63
MV PEAK A VEL: 1.22 M/S
MV PEAK E VEL: 1.09 M/S
OHS CV CPX 1 MINUTE RECOVERY HEART RATE: 115 BPM
OHS CV CPX 85 PERCENT MAX PREDICTED HEART RATE MALE: 128
OHS CV CPX ESTIMATED METS: 11
OHS CV CPX MAX PREDICTED HEART RATE: 150
OHS CV CPX PATIENT IS FEMALE: 0
OHS CV CPX PATIENT IS MALE: 1
OHS CV CPX PEAK DIASTOLIC BLOOD PRESSURE: 67 MMHG
OHS CV CPX PEAK HEAR RATE: 133
OHS CV CPX PEAK RATE PRESSURE PRODUCT: ABNORMAL
OHS CV CPX PEAK SYSTOLIC BLOOD PRESSURE: 198
OHS CV CPX PERCENT MAX PREDICTED HEART RATE ACHIEVED: 89
OHS CV CPX PERCENT TARGET HEART RATE ACHIEVED: 103.91
OHS CV CPX RATE PRESSURE PRODUCT PRESENTING: ABNORMAL
OHS CV CPX TARGET HEART RATE: 128
PULM VEIN S/D RATIO: 1.47
PV PEAK D VEL: 0.53 M/S
PV PEAK S VEL: 0.78 M/S
RA MAJOR: 4.21 CM
RA WIDTH: 3.89 CM
SINUS: 3.38 CM
STJ: 2.98 CM
STRESS ECHO POST EXERCISE DUR MIN: 6 MIN
STRESS ECHO POST EXERCISE DUR SEC: 28
SYSTOLIC BLOOD PRESSURE: 143
TDI LATERAL: 0.11
TDI SEPTAL: 0.08
TDI: 0.1
TRICUSPID ANNULAR PLANE SYSTOLIC EXCURSION: 2.67 CM

## 2019-02-21 ENCOUNTER — OFFICE VISIT (OUTPATIENT)
Dept: CARDIOLOGY | Facility: CLINIC | Age: 69
End: 2019-02-21
Payer: MEDICARE

## 2019-02-21 VITALS
WEIGHT: 202.38 LBS | BODY MASS INDEX: 27.41 KG/M2 | DIASTOLIC BLOOD PRESSURE: 66 MMHG | HEIGHT: 72 IN | SYSTOLIC BLOOD PRESSURE: 146 MMHG | HEART RATE: 62 BPM

## 2019-02-21 DIAGNOSIS — I25.9 ISCHEMIC HEART DISEASE DUE TO CORONARY ARTERY OBSTRUCTION: ICD-10-CM

## 2019-02-21 DIAGNOSIS — I10 ESSENTIAL HYPERTENSION: Primary | ICD-10-CM

## 2019-02-21 DIAGNOSIS — E78.2 MIXED HYPERLIPIDEMIA: ICD-10-CM

## 2019-02-21 DIAGNOSIS — I24.0 ISCHEMIC HEART DISEASE DUE TO CORONARY ARTERY OBSTRUCTION: ICD-10-CM

## 2019-02-21 PROCEDURE — 3077F PR MOST RECENT SYSTOLIC BLOOD PRESSURE >= 140 MM HG: ICD-10-PCS | Mod: CPTII,S$GLB,, | Performed by: INTERNAL MEDICINE

## 2019-02-21 PROCEDURE — 99214 PR OFFICE/OUTPT VISIT, EST, LEVL IV, 30-39 MIN: ICD-10-PCS | Mod: S$GLB,,, | Performed by: INTERNAL MEDICINE

## 2019-02-21 PROCEDURE — 1101F PT FALLS ASSESS-DOCD LE1/YR: CPT | Mod: CPTII,S$GLB,, | Performed by: INTERNAL MEDICINE

## 2019-02-21 PROCEDURE — 3078F PR MOST RECENT DIASTOLIC BLOOD PRESSURE < 80 MM HG: ICD-10-PCS | Mod: CPTII,S$GLB,, | Performed by: INTERNAL MEDICINE

## 2019-02-21 PROCEDURE — 99999 PR PBB SHADOW E&M-EST. PATIENT-LVL III: CPT | Mod: PBBFAC,,, | Performed by: INTERNAL MEDICINE

## 2019-02-21 PROCEDURE — 99999 PR PBB SHADOW E&M-EST. PATIENT-LVL III: ICD-10-PCS | Mod: PBBFAC,,, | Performed by: INTERNAL MEDICINE

## 2019-02-21 PROCEDURE — 1101F PR PT FALLS ASSESS DOC 0-1 FALLS W/OUT INJ PAST YR: ICD-10-PCS | Mod: CPTII,S$GLB,, | Performed by: INTERNAL MEDICINE

## 2019-02-21 PROCEDURE — 3077F SYST BP >= 140 MM HG: CPT | Mod: CPTII,S$GLB,, | Performed by: INTERNAL MEDICINE

## 2019-02-21 PROCEDURE — 99214 OFFICE O/P EST MOD 30 MIN: CPT | Mod: S$GLB,,, | Performed by: INTERNAL MEDICINE

## 2019-02-21 PROCEDURE — 3078F DIAST BP <80 MM HG: CPT | Mod: CPTII,S$GLB,, | Performed by: INTERNAL MEDICINE

## 2019-02-21 NOTE — PROGRESS NOTES
Subjective:    Patient ID:  Al Carter is a 69 y.o. male who presents for follow-up of Hypertension F/U (test results)      HPI  He comes for follow up with no major problems, no chest pain, no shortness of breath.  No syncope  FC II    Review of Systems   Constitution: Negative for decreased appetite, weakness, malaise/fatigue, weight gain and weight loss.   Cardiovascular: Negative for chest pain, dyspnea on exertion, leg swelling, palpitations and syncope.   Respiratory: Negative for cough and shortness of breath.    Gastrointestinal: Negative.    All other systems reviewed and are negative.       Objective:      Physical Exam   Constitutional: He is oriented to person, place, and time. He appears well-developed and well-nourished.   HENT:   Head: Normocephalic.   Eyes: Pupils are equal, round, and reactive to light.   Neck: Normal range of motion. Neck supple. No JVD present. Carotid bruit is not present. No thyromegaly present.   Cardiovascular: Normal rate, regular rhythm, normal heart sounds, intact distal pulses and normal pulses. PMI is not displaced. Exam reveals no gallop.   No murmur heard.  Pulmonary/Chest: Effort normal and breath sounds normal.   Abdominal: Soft. Normal appearance. He exhibits no mass. There is no hepatosplenomegaly. There is no tenderness.   Musculoskeletal: Normal range of motion. He exhibits no edema.   Neurological: He is alert and oriented to person, place, and time. He has normal strength and normal reflexes. No sensory deficit.   Skin: Skin is warm and intact.   Psychiatric: He has a normal mood and affect.   Nursing note and vitals reviewed.    Stress echo reviewed      Assessment:       1. Essential hypertension    2. Ischemic heart disease due to coronary artery obstruction    3. Mixed hyperlipidemia         Plan:     Continue all cardiac medications  Regular exercise program  1 yr f/u

## 2019-02-27 ENCOUNTER — TELEPHONE (OUTPATIENT)
Dept: FAMILY MEDICINE | Facility: CLINIC | Age: 69
End: 2019-02-27

## 2019-02-27 DIAGNOSIS — Z12.5 SCREENING FOR PROSTATE CANCER: Primary | ICD-10-CM

## 2019-02-27 NOTE — TELEPHONE ENCOUNTER
----- Message from Sarai Ta sent at 2/27/2019 11:25 AM CST -----  Type: Needs Medical Advice    Who Called:  Patient    Best Call Back Number: [phone] (home)     Additional Information: Patient's wife is requesting annual labs before his appt on 3/18/19  
I added psa  
Pt's appt reschedule to 7:30 3/18.   
cmp and lipid in the system. Please advise if anything needs to be added.   
Normal rate, regular rhythm.  Heart sounds S1, S2.  No murmurs, rubs or gallops.

## 2019-03-15 ENCOUNTER — OFFICE VISIT (OUTPATIENT)
Dept: ORTHOPEDICS | Facility: CLINIC | Age: 69
End: 2019-03-15
Payer: MEDICARE

## 2019-03-15 VITALS — HEIGHT: 72 IN | WEIGHT: 195 LBS | BODY MASS INDEX: 26.41 KG/M2

## 2019-03-15 DIAGNOSIS — M25.511 CHRONIC PAIN OF BOTH SHOULDERS: ICD-10-CM

## 2019-03-15 DIAGNOSIS — R20.0 NUMBNESS AND TINGLING OF RIGHT HAND: ICD-10-CM

## 2019-03-15 DIAGNOSIS — M19.011 PRIMARY OSTEOARTHRITIS OF BOTH SHOULDERS: ICD-10-CM

## 2019-03-15 DIAGNOSIS — M25.512 CHRONIC PAIN OF BOTH SHOULDERS: ICD-10-CM

## 2019-03-15 DIAGNOSIS — R20.2 NUMBNESS AND TINGLING OF RIGHT HAND: ICD-10-CM

## 2019-03-15 DIAGNOSIS — G56.01 CARPAL TUNNEL SYNDROME OF RIGHT WRIST: ICD-10-CM

## 2019-03-15 DIAGNOSIS — M19.012 PRIMARY OSTEOARTHRITIS OF BOTH SHOULDERS: ICD-10-CM

## 2019-03-15 DIAGNOSIS — G89.29 CHRONIC PAIN OF BOTH SHOULDERS: ICD-10-CM

## 2019-03-15 DIAGNOSIS — M79.641 RIGHT HAND PAIN: Primary | ICD-10-CM

## 2019-03-15 PROCEDURE — 1100F PTFALLS ASSESS-DOCD GE2>/YR: CPT | Mod: CPTII,S$GLB,, | Performed by: ORTHOPAEDIC SURGERY

## 2019-03-15 PROCEDURE — 3288F PR FALLS RISK ASSESSMENT DOCUMENTED: ICD-10-PCS | Mod: CPTII,S$GLB,, | Performed by: ORTHOPAEDIC SURGERY

## 2019-03-15 PROCEDURE — 3288F FALL RISK ASSESSMENT DOCD: CPT | Mod: CPTII,S$GLB,, | Performed by: ORTHOPAEDIC SURGERY

## 2019-03-15 PROCEDURE — 1100F PR PT FALLS ASSESS DOC 2+ FALLS/FALL W/INJURY/YR: ICD-10-PCS | Mod: CPTII,S$GLB,, | Performed by: ORTHOPAEDIC SURGERY

## 2019-03-15 PROCEDURE — 99999 PR PBB SHADOW E&M-EST. PATIENT-LVL II: CPT | Mod: PBBFAC,,, | Performed by: ORTHOPAEDIC SURGERY

## 2019-03-15 PROCEDURE — 20610 DRAIN/INJ JOINT/BURSA W/O US: CPT | Mod: 50,S$GLB,, | Performed by: ORTHOPAEDIC SURGERY

## 2019-03-15 PROCEDURE — 99999 PR PBB SHADOW E&M-EST. PATIENT-LVL II: ICD-10-PCS | Mod: PBBFAC,,, | Performed by: ORTHOPAEDIC SURGERY

## 2019-03-15 PROCEDURE — 20526 THER INJECTION CARP TUNNEL: CPT | Mod: 51,RT,S$GLB, | Performed by: ORTHOPAEDIC SURGERY

## 2019-03-15 PROCEDURE — 99214 OFFICE O/P EST MOD 30 MIN: CPT | Mod: 25,S$GLB,, | Performed by: ORTHOPAEDIC SURGERY

## 2019-03-15 PROCEDURE — 20526 CARPAL TUNNEL: ICD-10-PCS | Mod: 51,RT,S$GLB, | Performed by: ORTHOPAEDIC SURGERY

## 2019-03-15 PROCEDURE — 99214 PR OFFICE/OUTPT VISIT, EST, LEVL IV, 30-39 MIN: ICD-10-PCS | Mod: 25,S$GLB,, | Performed by: ORTHOPAEDIC SURGERY

## 2019-03-15 PROCEDURE — 20610 LARGE JOINT ASPIRATION/INJECTION: R SUBACROMIAL BURSA, L SUBACROMIAL BURSA: ICD-10-PCS | Mod: 50,S$GLB,, | Performed by: ORTHOPAEDIC SURGERY

## 2019-03-15 RX ORDER — TRIAMCINOLONE ACETONIDE 40 MG/ML
40 INJECTION, SUSPENSION INTRA-ARTICULAR; INTRAMUSCULAR
Status: DISCONTINUED | OUTPATIENT
Start: 2019-03-15 | End: 2019-03-15 | Stop reason: HOSPADM

## 2019-03-15 RX ORDER — TRIAMCINOLONE ACETONIDE 40 MG/ML
80 INJECTION, SUSPENSION INTRA-ARTICULAR; INTRAMUSCULAR
Status: DISCONTINUED | OUTPATIENT
Start: 2019-03-15 | End: 2019-03-15 | Stop reason: HOSPADM

## 2019-03-15 RX ADMIN — TRIAMCINOLONE ACETONIDE 40 MG: 40 INJECTION, SUSPENSION INTRA-ARTICULAR; INTRAMUSCULAR at 10:03

## 2019-03-15 RX ADMIN — TRIAMCINOLONE ACETONIDE 80 MG: 40 INJECTION, SUSPENSION INTRA-ARTICULAR; INTRAMUSCULAR at 10:03

## 2019-03-15 NOTE — PROCEDURES
Carpal Tunnel  Date/Time: 3/15/2019 10:42 AM  Performed by: Joe Wolfe MD  Authorized by: Joe Wolfe MD     Consent Done?: Yes (Verbal)  Indications: Pain    Location:  Wrist  Ultrasonic Guidance for needle placement: No  Needle size:  25 G  Medications:  40 mg triamcinolone acetonide 40 mg/mL  Patient tolerance:  Patient tolerated the procedure well with no immediate complications

## 2019-03-15 NOTE — PROCEDURES
Large Joint Aspiration/Injection: R subacromial bursa, L subacromial bursa  Date/Time: 3/15/2019 10:42 AM  Performed by: Joe Wolfe MD  Authorized by: Joe Wolfe MD     Consent Done?:  Yes (Verbal)  Indications:  Pain  Procedure site marked: Yes    Timeout: Prior to procedure the correct patient, procedure, and site was verified      Location:  Shoulder  Site:  R subacromial bursa and L subacromial bursa  Prep: Patient was prepped and draped in usual sterile fashion    Ultrasonic Guidance for needle placement: No  Needle size:  22 G  Approach:  Posterior  Medications:  80 mg triamcinolone acetonide 40 mg/mL  Patient tolerance:  Patient tolerated the procedure well with no immediate complications

## 2019-03-15 NOTE — PROGRESS NOTES
This 69 years old with bilateral shoulder pain and wrist pain found since the   carpal tunnel syndrome, has received injections in both of these areas in the   past, responded well, he is requesting injection today.    ASSESSMENT:    1.  Right wrist carpal tunnel syndrome.  2.  Bilateral shoulder pain, probable cuff tears in this 69-year-old.    PLAN:  Kenalog injection to both shoulders, Kenalog injection to the right   wrist.  We gave him the option of surgical release.  He is not interested.    Followup as needed.      PBB/HN  dd: 03/15/2019 10:47:12 (CDT)  td: 03/16/2019 00:16:43 (CDT)  Doc ID   #[id number]  Job ID #[id number]    CC:     Further History  Aching pain  Worse with activity  Relieved with rest  No other associated symptoms  No other radiation    Further Exam  Alert and oriented  Pleasant  Contralateral limb has appropriate range of motion for age and condition  Contralateral limb has appropriate strength for age and condition  Contralateral limb has appropriate stability  for age and condition  No adenopathy  Pulses are appropriate for current condition  Skin is intact        Chief Complaint    Chief Complaint   Patient presents with    Right Wrist - Pain, Numbness    Right Shoulder - Pain    Left Shoulder - Pain       KIARA Carter is a 69 y.o.  male who presents with       Past Medical History  Past Medical History:   Diagnosis Date    Anticoagulant long-term use     ASA 81 mg, Plavix    Arthritis     Knees and Hands    Cancer     Skin, face    Coronary artery disease     with 2 stents, lad, 2011    Hyperlipidemia     Melanoma     Squamous cell carcinoma        Past Surgical History  Past Surgical History:   Procedure Laterality Date    BACK SURGERY  1976, 1981    L5 Laminectomy, X 2    CARDIAC CATHETERIZATION  10/2011    1 stent LAD    CARDIAC SURGERY      COLONOSCOPY      EXCISION-CARCINOMA face Right 9/28/2015    Performed by [facility] Cumm[facility] at [facility] OR    JOINT REPLACEMENT  Right     Knee, total    KNEE ARTHROSCOPY Left        Medications  Current Outpatient Medications   Medication Sig    clopidogrel (PLAVIX) 75 mg tablet TAKE 1 TABLET BY MOUTH EVERY DAY    nitroGLYCERIN (NITROSTAT) 0.4 MG SL tablet Place 0.4 mg under the tongue every 5 (five) minutes as needed.      traZODone (DESYREL) 100 MG tablet TAKE 1 TABLET(100 MG) BY MOUTH EVERY EVENING     No current facility-administered medications for this visit.        Allergies  Review of patient's allergies indicates:   Allergen Reactions    Crestor [rosuvastatin]     Lipitor [atorvastatin]     Statins-hmg-coa reductase inhibitors      Weakness and muscle pain       Family History  Family History   Problem Relation Age of Onset    Hypertension Sister     Diabetes Neg Hx        Social History  Social History     Socioeconomic History    Marital status:      Spouse name: Not on file    Number of children: Not on file    Years of education: Not on file    Highest education level: Not on file   Social Needs    Financial resource strain: Not on file    Food insecurity - worry: Not on file    Food insecurity - inability: Not on file    Transportation needs - medical: Not on file    Transportation needs - non-medical: Not on file   Occupational History    Not on file   Tobacco Use    Smoking status: Current Some Day Smoker     Packs/day: 0.25     Types: Cigarettes     Start date: 9/24/1964    Smokeless tobacco: Never Used    Tobacco comment: Smoked 3 packs at most, now down to 6-7 per day   Substance and Sexual Activity    Alcohol use: Yes     Alcohol/week: 0.0 oz     Comment: rare    Drug use: No    Sexual activity: Not on file   Other Topics Concern    Not on file   Social History Narrative    Not on file               Review of Systems     Constitutional: Negative    HENT: Negative  Eyes: Negative  Respiratory: Negative  Cardiovascular: Negative  Musculoskeletal: HPI  Skin: Negative  Neurological:  Negative  Hematological: Negative  Endocrine: Negative                 Physical Exam    There were no vitals filed for this visit.  Body mass index is 26.45 kg/m².  Physical Examination:     General appearance -  well appearing, and in no distress  Mental status - awake  Neck - supple  Chest -  symmetric air entry  Heart - normal rate   Abdomen - soft      Assessment     1. Right hand pain    2. Carpal tunnel syndrome of right wrist    3. Numbness and tingling of right hand    4. Chronic pain of both shoulders    5. Primary osteoarthritis of both shoulders          Plan

## 2019-03-18 ENCOUNTER — OFFICE VISIT (OUTPATIENT)
Dept: FAMILY MEDICINE | Facility: CLINIC | Age: 69
End: 2019-03-18
Payer: MEDICARE

## 2019-03-18 ENCOUNTER — LAB VISIT (OUTPATIENT)
Dept: LAB | Facility: HOSPITAL | Age: 69
End: 2019-03-18
Attending: INTERNAL MEDICINE
Payer: MEDICARE

## 2019-03-18 VITALS
BODY MASS INDEX: 27.33 KG/M2 | SYSTOLIC BLOOD PRESSURE: 122 MMHG | HEIGHT: 72 IN | WEIGHT: 201.81 LBS | DIASTOLIC BLOOD PRESSURE: 74 MMHG | HEART RATE: 60 BPM | TEMPERATURE: 98 F

## 2019-03-18 DIAGNOSIS — Z00.00 HEALTH MAINTENANCE EXAMINATION: ICD-10-CM

## 2019-03-18 DIAGNOSIS — I10 ESSENTIAL HYPERTENSION: ICD-10-CM

## 2019-03-18 DIAGNOSIS — E78.2 MIXED HYPERLIPIDEMIA: ICD-10-CM

## 2019-03-18 DIAGNOSIS — I24.0 ISCHEMIC HEART DISEASE DUE TO CORONARY ARTERY OBSTRUCTION: Primary | ICD-10-CM

## 2019-03-18 DIAGNOSIS — I25.9 ISCHEMIC HEART DISEASE DUE TO CORONARY ARTERY OBSTRUCTION: Primary | ICD-10-CM

## 2019-03-18 DIAGNOSIS — Z72.0 TOBACCO ABUSE: ICD-10-CM

## 2019-03-18 DIAGNOSIS — Z12.11 COLON CANCER SCREENING: ICD-10-CM

## 2019-03-18 DIAGNOSIS — Z12.5 SCREENING FOR PROSTATE CANCER: ICD-10-CM

## 2019-03-18 LAB
ALBUMIN SERPL BCP-MCNC: 4.1 G/DL
ALP SERPL-CCNC: 51 U/L
ALT SERPL W/O P-5'-P-CCNC: 21 U/L
ANION GAP SERPL CALC-SCNC: 9 MMOL/L
AST SERPL-CCNC: 18 U/L
BILIRUB SERPL-MCNC: 0.4 MG/DL
BUN SERPL-MCNC: 22 MG/DL
CALCIUM SERPL-MCNC: 9.7 MG/DL
CHLORIDE SERPL-SCNC: 107 MMOL/L
CHOLEST SERPL-MCNC: 269 MG/DL
CHOLEST/HDLC SERPL: 5.2 {RATIO}
CO2 SERPL-SCNC: 25 MMOL/L
COMPLEXED PSA SERPL-MCNC: 1.4 NG/ML
CREAT SERPL-MCNC: 0.8 MG/DL
EST. GFR  (AFRICAN AMERICAN): >60 ML/MIN/1.73 M^2
EST. GFR  (NON AFRICAN AMERICAN): >60 ML/MIN/1.73 M^2
GLUCOSE SERPL-MCNC: 86 MG/DL
HDLC SERPL-MCNC: 52 MG/DL
HDLC SERPL: 19.3 %
LDLC SERPL CALC-MCNC: 192.6 MG/DL
NONHDLC SERPL-MCNC: 217 MG/DL
POTASSIUM SERPL-SCNC: 4.7 MMOL/L
PROT SERPL-MCNC: 7.1 G/DL
SODIUM SERPL-SCNC: 141 MMOL/L
TRIGL SERPL-MCNC: 122 MG/DL

## 2019-03-18 PROCEDURE — 99397 PR PREVENTIVE VISIT,EST,65 & OVER: ICD-10-PCS | Mod: S$GLB,,, | Performed by: FAMILY MEDICINE

## 2019-03-18 PROCEDURE — 3078F PR MOST RECENT DIASTOLIC BLOOD PRESSURE < 80 MM HG: ICD-10-PCS | Mod: CPTII,S$GLB,, | Performed by: FAMILY MEDICINE

## 2019-03-18 PROCEDURE — 3074F SYST BP LT 130 MM HG: CPT | Mod: CPTII,S$GLB,, | Performed by: FAMILY MEDICINE

## 2019-03-18 PROCEDURE — 36415 COLL VENOUS BLD VENIPUNCTURE: CPT | Mod: PN

## 2019-03-18 PROCEDURE — 3074F PR MOST RECENT SYSTOLIC BLOOD PRESSURE < 130 MM HG: ICD-10-PCS | Mod: CPTII,S$GLB,, | Performed by: FAMILY MEDICINE

## 2019-03-18 PROCEDURE — 99999 PR PBB SHADOW E&M-EST. PATIENT-LVL III: ICD-10-PCS | Mod: PBBFAC,,, | Performed by: FAMILY MEDICINE

## 2019-03-18 PROCEDURE — 84153 ASSAY OF PSA TOTAL: CPT

## 2019-03-18 PROCEDURE — 99999 PR PBB SHADOW E&M-EST. PATIENT-LVL III: CPT | Mod: PBBFAC,,, | Performed by: FAMILY MEDICINE

## 2019-03-18 PROCEDURE — 80053 COMPREHEN METABOLIC PANEL: CPT

## 2019-03-18 PROCEDURE — 3078F DIAST BP <80 MM HG: CPT | Mod: CPTII,S$GLB,, | Performed by: FAMILY MEDICINE

## 2019-03-18 PROCEDURE — 99397 PER PM REEVAL EST PAT 65+ YR: CPT | Mod: S$GLB,,, | Performed by: FAMILY MEDICINE

## 2019-03-18 PROCEDURE — 80061 LIPID PANEL: CPT

## 2019-03-18 RX ORDER — INDOMETHACIN 25 MG/1
25 CAPSULE ORAL
Qty: 60 CAPSULE | Refills: 2 | Status: SHIPPED | OUTPATIENT
Start: 2019-03-18 | End: 2019-06-24 | Stop reason: SDUPTHER

## 2019-03-18 NOTE — PROGRESS NOTES
Subjective:       Patient ID: Al Carter is a 69 y.o. male.    Chief Complaint: Annual Exam (Annual check up. Pt fasting for lab)    Annual exam.  He has coronary disease with a stent and takes Plavix.  He is intolerant of statins.  He had some blood pressure elevation but it is doing better now.  He has seen Cardiology and had a stress test and echocardiogram and a lower extremity venous Doppler.  He still smokes 1 pack per day but is considering a trial on they being.  He declines all vaccines.  He had a Cologuard in 2017 so would be due in November of 2020.  He has right sciatica with lateral lower leg numbness.  He previously had some weakness on that side but that has improved.  He has bilateral shoulder arthritis as well as right carpal tunnel syndrome.  He had a recent right wrist injection.  Past Medical History, Surgical History, Family History, Social History, Medications and Allergies reviewed.         Review of Systems   Constitutional: Negative for fatigue, fever and unexpected weight change.   HENT: Negative.    Eyes: Negative for visual disturbance.   Respiratory: Negative for cough, shortness of breath and wheezing.    Cardiovascular: Negative for chest pain, palpitations and leg swelling.   Gastrointestinal: Negative for abdominal pain, blood in stool and diarrhea.   Genitourinary: Negative for difficulty urinating and hematuria.   Musculoskeletal: Positive for arthralgias and back pain.   Skin:        No neoplasms    Neurological: Negative for weakness and numbness.       Objective:     Blood pressure 122/74, pulse 60, temperature 97.8 °F (36.6 °C), temperature source Oral, height 6' (1.829 m), weight 91.6 kg (201 lb 13.3 oz).      Physical Exam   Constitutional: He appears well-developed and well-nourished.   No distress   HENT:   Nose clear. TM's wnl. No oral lesions.    Eyes: Conjunctivae and EOM are normal. Pupils are equal, round, and reactive to light.   Neck: Normal range of motion. No  thyromegaly present.   Cardiovascular: Normal rate, regular rhythm, normal heart sounds and intact distal pulses.   No murmur heard.  No carotid bruits.   Pulmonary/Chest: Effort normal and breath sounds normal. He has no wheezes. He has no rales.   Abdominal: Soft. Bowel sounds are normal. He exhibits no mass. There is no tenderness.   Musculoskeletal: He exhibits no edema.   Lymphadenopathy:     He has no cervical adenopathy.   Neurological: He is alert.   Skin:   No rash or lesions       Assessment:       1. Ischemic heart disease due to coronary artery obstruction    2. Essential hypertension    3. Mixed hyperlipidemia    4. Tobacco abuse    5. Colon cancer screening    6. Health maintenance examination        Plan:       Lab work today.  Cologuard next year.  I refilled indomethacin 25 mg t.i.d. to use only p.r.n..  We discussed smoking cessation.

## 2019-03-19 ENCOUNTER — TELEPHONE (OUTPATIENT)
Dept: FAMILY MEDICINE | Facility: CLINIC | Age: 69
End: 2019-03-19

## 2019-03-19 NOTE — TELEPHONE ENCOUNTER
----- Message from Kassandra Mcmanus sent at 3/19/2019  9:40 AM CDT -----  Type:  Patient Returning Call    Who Called:  Patient  Who Left Message for Patient:  n/a  Does the patient know what this is regarding?:  no  Best Call Back Number:  [phone]

## 2019-04-23 RX ORDER — CLOPIDOGREL BISULFATE 75 MG/1
TABLET ORAL
Qty: 30 TABLET | Refills: 0 | Status: SHIPPED | OUTPATIENT
Start: 2019-04-23 | End: 2019-05-24 | Stop reason: SDUPTHER

## 2019-05-21 DIAGNOSIS — Z12.11 COLON CANCER SCREENING: ICD-10-CM

## 2019-05-26 RX ORDER — CLOPIDOGREL BISULFATE 75 MG/1
TABLET ORAL
Qty: 30 TABLET | Refills: 0 | Status: SHIPPED | OUTPATIENT
Start: 2019-05-26 | End: 2019-06-24 | Stop reason: SDUPTHER

## 2019-05-28 ENCOUNTER — LAB VISIT (OUTPATIENT)
Dept: LAB | Facility: HOSPITAL | Age: 69
End: 2019-05-28
Attending: FAMILY MEDICINE
Payer: MEDICARE

## 2019-05-28 DIAGNOSIS — Z12.11 COLON CANCER SCREENING: ICD-10-CM

## 2019-05-28 PROCEDURE — 82274 ASSAY TEST FOR BLOOD FECAL: CPT

## 2019-05-31 LAB — HEMOCCULT STL QL IA: NEGATIVE

## 2019-06-24 RX ORDER — INDOMETHACIN 25 MG/1
CAPSULE ORAL
Qty: 60 CAPSULE | Refills: 0 | Status: SHIPPED | OUTPATIENT
Start: 2019-06-24 | End: 2019-07-27 | Stop reason: SDUPTHER

## 2019-06-24 RX ORDER — CLOPIDOGREL BISULFATE 75 MG/1
TABLET ORAL
Qty: 30 TABLET | Refills: 0 | Status: SHIPPED | OUTPATIENT
Start: 2019-06-24 | End: 2019-07-27 | Stop reason: SDUPTHER

## 2019-06-28 ENCOUNTER — OFFICE VISIT (OUTPATIENT)
Dept: ORTHOPEDICS | Facility: CLINIC | Age: 69
End: 2019-06-28
Payer: MEDICARE

## 2019-06-28 ENCOUNTER — TELEPHONE (OUTPATIENT)
Dept: ORTHOPEDICS | Facility: CLINIC | Age: 69
End: 2019-06-28

## 2019-06-28 VITALS — WEIGHT: 201 LBS | HEIGHT: 72 IN | BODY MASS INDEX: 27.22 KG/M2

## 2019-06-28 DIAGNOSIS — M79.641 RIGHT HAND PAIN: Primary | ICD-10-CM

## 2019-06-28 DIAGNOSIS — M25.512 CHRONIC PAIN OF BOTH SHOULDERS: ICD-10-CM

## 2019-06-28 DIAGNOSIS — R20.0 NUMBNESS AND TINGLING OF RIGHT HAND: ICD-10-CM

## 2019-06-28 DIAGNOSIS — M25.511 CHRONIC PAIN OF BOTH SHOULDERS: ICD-10-CM

## 2019-06-28 DIAGNOSIS — G89.29 CHRONIC PAIN OF BOTH SHOULDERS: ICD-10-CM

## 2019-06-28 DIAGNOSIS — M19.011 PRIMARY OSTEOARTHRITIS OF BOTH SHOULDERS: ICD-10-CM

## 2019-06-28 DIAGNOSIS — R20.2 NUMBNESS AND TINGLING OF RIGHT HAND: ICD-10-CM

## 2019-06-28 DIAGNOSIS — M19.012 PRIMARY OSTEOARTHRITIS OF BOTH SHOULDERS: ICD-10-CM

## 2019-06-28 DIAGNOSIS — G56.01 CARPAL TUNNEL SYNDROME OF RIGHT WRIST: ICD-10-CM

## 2019-06-28 PROCEDURE — 1101F PT FALLS ASSESS-DOCD LE1/YR: CPT | Mod: CPTII,S$GLB,, | Performed by: ORTHOPAEDIC SURGERY

## 2019-06-28 PROCEDURE — 20610 LARGE JOINT ASPIRATION/INJECTION: L SUBACROMIAL BURSA: ICD-10-PCS | Mod: LT,S$GLB,, | Performed by: ORTHOPAEDIC SURGERY

## 2019-06-28 PROCEDURE — 20526 CARPAL TUNNEL: ICD-10-PCS | Mod: 51,RT,S$GLB, | Performed by: ORTHOPAEDIC SURGERY

## 2019-06-28 PROCEDURE — 20610 DRAIN/INJ JOINT/BURSA W/O US: CPT | Mod: LT,S$GLB,, | Performed by: ORTHOPAEDIC SURGERY

## 2019-06-28 PROCEDURE — 99214 PR OFFICE/OUTPT VISIT, EST, LEVL IV, 30-39 MIN: ICD-10-PCS | Mod: 25,S$GLB,, | Performed by: ORTHOPAEDIC SURGERY

## 2019-06-28 PROCEDURE — 1101F PR PT FALLS ASSESS DOC 0-1 FALLS W/OUT INJ PAST YR: ICD-10-PCS | Mod: CPTII,S$GLB,, | Performed by: ORTHOPAEDIC SURGERY

## 2019-06-28 PROCEDURE — 99999 PR PBB SHADOW E&M-EST. PATIENT-LVL II: CPT | Mod: PBBFAC,,, | Performed by: ORTHOPAEDIC SURGERY

## 2019-06-28 PROCEDURE — 99999 PR PBB SHADOW E&M-EST. PATIENT-LVL II: ICD-10-PCS | Mod: PBBFAC,,, | Performed by: ORTHOPAEDIC SURGERY

## 2019-06-28 PROCEDURE — 20526 THER INJECTION CARP TUNNEL: CPT | Mod: 51,RT,S$GLB, | Performed by: ORTHOPAEDIC SURGERY

## 2019-06-28 PROCEDURE — 99214 OFFICE O/P EST MOD 30 MIN: CPT | Mod: 25,S$GLB,, | Performed by: ORTHOPAEDIC SURGERY

## 2019-06-28 RX ORDER — TRIAMCINOLONE ACETONIDE 40 MG/ML
80 INJECTION, SUSPENSION INTRA-ARTICULAR; INTRAMUSCULAR
Status: DISCONTINUED | OUTPATIENT
Start: 2019-06-28 | End: 2019-06-28 | Stop reason: HOSPADM

## 2019-06-28 RX ORDER — TRIAMCINOLONE ACETONIDE 40 MG/ML
40 INJECTION, SUSPENSION INTRA-ARTICULAR; INTRAMUSCULAR
Status: DISCONTINUED | OUTPATIENT
Start: 2019-06-28 | End: 2019-06-28 | Stop reason: HOSPADM

## 2019-06-28 RX ADMIN — TRIAMCINOLONE ACETONIDE 40 MG: 40 INJECTION, SUSPENSION INTRA-ARTICULAR; INTRAMUSCULAR at 10:06

## 2019-06-28 RX ADMIN — TRIAMCINOLONE ACETONIDE 80 MG: 40 INJECTION, SUSPENSION INTRA-ARTICULAR; INTRAMUSCULAR at 10:06

## 2019-06-28 NOTE — PROCEDURES
Carpal Tunnel  Date/Time: 6/28/2019 10:06 AM  Performed by: Joe Wolfe MD  Authorized by: Joe Wolfe MD     Consent Done?: Yes (Verbal)  Indications: Pain    Location:  Wrist  Ultrasonic Guidance for needle placement: No  Needle size:  25 G  Medications:  40 mg triamcinolone acetonide 40 mg/mL  Patient tolerance:  Patient tolerated the procedure well with no immediate complications

## 2019-06-28 NOTE — PROCEDURES
Large Joint Aspiration/Injection: L subacromial bursa  Date/Time: 6/28/2019 10:05 AM  Performed by: Joe Wolfe MD  Authorized by: Joe Wolfe MD     Consent Done?:  Yes (Verbal)  Indications:  Pain  Procedure site marked: Yes    Timeout: Prior to procedure the correct patient, procedure, and site was verified      Location:  Shoulder  Site:  L subacromial bursa  Prep: Patient was prepped and draped in usual sterile fashion    Ultrasonic Guidance for needle placement: No  Needle size:  21 G  Approach:  Posterior  Medications:  80 mg triamcinolone acetonide 40 mg/mL  Patient tolerance:  Patient tolerated the procedure well with no immediate complications

## 2019-06-28 NOTE — TELEPHONE ENCOUNTER
Right carpal tunnel surgery scheduled for 8/8/19.    ----- Message from Venkatesh Smith sent at 6/28/2019 10:24 AM CDT -----  Contact: pt  Patient needs to [facility]. [phone]

## 2019-06-28 NOTE — PROGRESS NOTES
Dictation #1  MRN:[id number]  [facility]:[id number]  Further History  Aching pain  Worse with activity  Relieved with rest  No other associated symptoms  No other radiation    Further Exam  Alert and oriented  Pleasant  Contralateral limb has appropriate range of motion for age and condition  Contralateral limb has appropriate strength for age and condition  Contralateral limb has appropriate stability  for age and condition  No adenopathy  Pulses are appropriate for current condition  Skin is intact        Chief Complaint    Chief Complaint   Patient presents with    Left Shoulder - Pain    Right Wrist - Pain, Numbness    Right Hand - Pain, Numbness       HPI  Al Carter is a 69 y.o.  male who presents with       Past Medical History  Past Medical History:   Diagnosis Date    Anticoagulant long-term use     ASA 81 mg, Plavix    Arthritis     Knees and Hands    Cancer     Skin, face    Coronary artery disease     with 2 stents, lad, 2011    Hyperlipidemia     Melanoma     Squamous cell carcinoma        Past Surgical History  Past Surgical History:   Procedure Laterality Date    BACK SURGERY  1976, 1981    L5 Laminectomy, X 2    CARDIAC CATHETERIZATION  10/2011    1 stent LAD    CARDIAC SURGERY      COLONOSCOPY      EXCISION-CARCINOMA face Right 9/28/2015    Per[facility] OR    JOINT REPLACEMENT Right     Knee, total    KNEE ARTHROSCOPY Left        Medications  Current Outpatient Medications   Medication Sig    clopidogrel (PLAVIX) 75 mg tablet TAKE 1 TABLET BY MOUTH EVERY DAY    clopidogrel (PLAVIX) 75 mg tablet TAKE 1 TABLET(75 MG) BY MOUTH EVERY DAY    indomethacin (INDOCIN) 25 MG capsule TAKE 1 CAPSULE(25 MG) BY MOUTH THREE TIMES DAILY WITH MEALS    nitroGLYCERIN (NITROSTAT) 0.4 MG SL tablet Place 0.4 mg under the tongue every 5 (five) minutes as needed.      traZODone (DESYREL) 100 MG tablet TAKE 1 TABLET(100 MG) BY MOUTH EVERY EVENING     No current facility-administered  medications for this visit.        Allergies  Review of patient's allergies indicates:   Allergen Reactions    Crestor [rosuvastatin]     Lipitor [atorvastatin]     Statins-hmg-coa reductase inhibitors      Weakness and muscle pain       Family History  Family History   Problem Relation Age of Onset    Hypertension Sister     Diabetes Neg Hx        Social History  Social History     Socioeconomic History    Marital status:      Spouse name: Not on file    Number of children: Not on file    Years of education: Not on file    Highest education level: Not on file   Occupational History    Not on file   Social Needs    Financial resource strain: Not on file    Food insecurity:     Worry: Not on file     Inability: Not on file    Transportation needs:     Medical: Not on file     Non-medical: Not on file   Tobacco Use    Smoking status: Current Some Day Smoker     Packs/day: 0.25     Types: Cigarettes     Start date: 9/24/1964    Smokeless tobacco: Never Used    Tobacco comment: Smoked 3 packs at most, now down to 6-7 per day   Substance and Sexual Activity    Alcohol use: Yes     Alcohol/week: 0.0 oz     Comment: rare    Drug use: No    Sexual activity: Not on file   Lifestyle    Physical activity:     Days per week: Not on file     Minutes per session: Not on file    Stress: Not on file   Relationships    Social connections:     Talks on phone: Not on file     Gets together: Not on file     Attends Latter-day service: Not on file     Active member of club or organization: Not on file     Attends meetings of clubs or organizations: Not on file     Relationship status: Not on file   Other Topics Concern    Not on file   Social History Narrative    Not on file               Review of Systems     Constitutional: Negative    HENT: Negative  Eyes: Negative  Respiratory: Negative  Cardiovascular: Negative  Musculoskeletal: HPI  Skin: Negative  Neurological: Negative  Hematological:  Negative  Endocrine: Negative                 Physical Exam    There were no vitals filed for this visit.  Body mass index is 27.26 kg/m².  Physical Examination:     General appearance -  well appearing, and in no distress  Mental status - awake  Neck - supple  Chest -  symmetric air entry  Heart - normal rate   Abdomen - soft      Assessment     1. Right hand pain    2. Carpal tunnel syndrome of right wrist    3. Numbness and tingling of right hand    4. Chronic pain of both shoulders    5. Primary osteoarthritis of both shoulders      Procedure Note:    After obtaining consent and giving kenalog information and Lewis post-injection information, the injection site was prepped in a sterile fashion. The right wrist was  injected with 20 mg kenalog and .5 cc of 1% lidocaine using an ulnar sided approach.  No medication was wasted.  Patient tolerated the injection well. Patient was instructed to implement a period of relative rest for 24hrs and report any adverse reactions.       Plan

## 2019-06-29 NOTE — PROGRESS NOTES
A 69 years old, recurrent pain in the right hand and wrist from carpal tunnel   syndrome, responded well to injection in the past, requesting an injection   today.  Also, having recurrence of symptoms in left shoulder consistent with   cuff tendinitis, cuff tear, requesting an injection here.  No signs of   infection.  I will inject the left shoulder today with Kenalog, right wrist   today with Kenalog for carpal tunnel syndrome.  We discussed with him surgical   release, which he is interested in.  He is aware of the risks and limitations   ____ proceed with carpal tunnel release on the right hand.      BRANDON/TRENTON  dd: 06/28/2019 10:15:05 (CDT)  td: 06/28/2019 21:13:00 (CDT)  Doc ID   #[id number]  Job ID #[id number]    CC:

## 2019-07-02 DIAGNOSIS — G56.01 CARPAL TUNNEL SYNDROME ON RIGHT: ICD-10-CM

## 2019-07-02 DIAGNOSIS — R20.0 NUMBNESS AND TINGLING OF RIGHT HAND: ICD-10-CM

## 2019-07-02 DIAGNOSIS — G56.01 CARPAL TUNNEL SYNDROME OF RIGHT WRIST: ICD-10-CM

## 2019-07-02 DIAGNOSIS — M79.641 RIGHT HAND PAIN: Primary | ICD-10-CM

## 2019-07-02 DIAGNOSIS — R20.2 NUMBNESS AND TINGLING OF RIGHT HAND: ICD-10-CM

## 2019-07-02 RX ORDER — SODIUM CHLORIDE 9 MG/ML
INJECTION, SOLUTION INTRAVENOUS CONTINUOUS
Status: CANCELLED | OUTPATIENT
Start: 2019-07-02

## 2019-07-26 ENCOUNTER — TELEPHONE (OUTPATIENT)
Dept: ORTHOPEDICS | Facility: CLINIC | Age: 69
End: 2019-07-26

## 2019-07-26 DIAGNOSIS — G56.03 BILATERAL CARPAL TUNNEL SYNDROME: Primary | ICD-10-CM

## 2019-07-26 DIAGNOSIS — M25.531 BILATERAL WRIST PAIN: ICD-10-CM

## 2019-07-26 DIAGNOSIS — M25.532 BILATERAL WRIST PAIN: ICD-10-CM

## 2019-07-26 NOTE — TELEPHONE ENCOUNTER
Called patient and notified patient that his insurance company is requiring that he have a EMG done prior to surgery. EMG ordered and sent to Dr. Gale to be scheduled. Notified referral team to see about having EMG approved asap. Patient verbalized understanding and was ok with test being ordered.

## 2019-07-27 RX ORDER — INDOMETHACIN 25 MG/1
CAPSULE ORAL
Qty: 60 CAPSULE | Refills: 1 | Status: SHIPPED | OUTPATIENT
Start: 2019-07-27 | End: 2019-09-23 | Stop reason: SDUPTHER

## 2019-07-29 ENCOUNTER — TELEPHONE (OUTPATIENT)
Dept: ORTHOPEDICS | Facility: CLINIC | Age: 69
End: 2019-07-29

## 2019-07-29 RX ORDER — CLOPIDOGREL BISULFATE 75 MG/1
TABLET ORAL
Qty: 30 TABLET | Refills: 0 | Status: SHIPPED | OUTPATIENT
Start: 2019-07-29 | End: 2019-08-26 | Stop reason: SDUPTHER

## 2019-07-29 NOTE — TELEPHONE ENCOUNTER
Talked with patient and he is going to go to EMG appointment today. Confirmed appointment date time and location with patient. ----- Message from Jory Ruiz MA sent at 7/29/2019 11:26 AM CDT -----  Contact: marcelina   Has not had call back for EMG   Call back

## 2019-08-06 DIAGNOSIS — Z98.890 S/P CARPAL TUNNEL RELEASE: Primary | ICD-10-CM

## 2019-08-07 ENCOUNTER — ANESTHESIA EVENT (OUTPATIENT)
Dept: SURGERY | Facility: HOSPITAL | Age: 69
End: 2019-08-07
Payer: MEDICARE

## 2019-08-07 RX ORDER — OXYCODONE AND ACETAMINOPHEN 5; 325 MG/1; MG/1
1 TABLET ORAL
Qty: 32 TABLET | Refills: 0 | Status: SHIPPED | OUTPATIENT
Start: 2019-08-07 | End: 2020-08-06

## 2019-08-08 ENCOUNTER — ANESTHESIA (OUTPATIENT)
Dept: SURGERY | Facility: HOSPITAL | Age: 69
End: 2019-08-08
Payer: MEDICARE

## 2019-08-08 ENCOUNTER — HOSPITAL ENCOUNTER (OUTPATIENT)
Facility: HOSPITAL | Age: 69
Discharge: HOME | End: 2019-08-08
Attending: ORTHOPAEDIC SURGERY | Admitting: ORTHOPAEDIC SURGERY
Payer: MEDICARE

## 2019-08-08 VITALS
SYSTOLIC BLOOD PRESSURE: 132 MMHG | HEIGHT: 72 IN | DIASTOLIC BLOOD PRESSURE: 63 MMHG | HEART RATE: 58 BPM | OXYGEN SATURATION: 96 % | BODY MASS INDEX: 27.09 KG/M2 | WEIGHT: 200 LBS | RESPIRATION RATE: 16 BRPM | TEMPERATURE: 98 F

## 2019-08-08 DIAGNOSIS — G56.01 CARPAL TUNNEL SYNDROME OF RIGHT WRIST: ICD-10-CM

## 2019-08-08 DIAGNOSIS — R20.2 NUMBNESS AND TINGLING OF RIGHT HAND: ICD-10-CM

## 2019-08-08 DIAGNOSIS — R20.0 NUMBNESS AND TINGLING OF RIGHT HAND: ICD-10-CM

## 2019-08-08 DIAGNOSIS — M79.641 RIGHT HAND PAIN: ICD-10-CM

## 2019-08-08 DIAGNOSIS — G56.01 CARPAL TUNNEL SYNDROME ON RIGHT: ICD-10-CM

## 2019-08-08 PROCEDURE — 37000009 HC ANESTHESIA EA ADD 15 MINS: Mod: PO | Performed by: ORTHOPAEDIC SURGERY

## 2019-08-08 PROCEDURE — D9220A PRA ANESTHESIA: ICD-10-PCS | Mod: ANES,,, | Performed by: ANESTHESIOLOGY

## 2019-08-08 PROCEDURE — 63600175 PHARM REV CODE 636 W HCPCS: Mod: PO | Performed by: ANESTHESIOLOGY

## 2019-08-08 PROCEDURE — 27200651 HC AIRWAY, LMA: Mod: PO | Performed by: NURSE ANESTHETIST, CERTIFIED REGISTERED

## 2019-08-08 PROCEDURE — 37000008 HC ANESTHESIA 1ST 15 MINUTES: Mod: PO | Performed by: ORTHOPAEDIC SURGERY

## 2019-08-08 PROCEDURE — 63600175 PHARM REV CODE 636 W HCPCS: Mod: PO | Performed by: NURSE ANESTHETIST, CERTIFIED REGISTERED

## 2019-08-08 PROCEDURE — 71000033 HC RECOVERY, INTIAL HOUR: Mod: PO | Performed by: ORTHOPAEDIC SURGERY

## 2019-08-08 PROCEDURE — D9220A PRA ANESTHESIA: Mod: ANES,,, | Performed by: ANESTHESIOLOGY

## 2019-08-08 PROCEDURE — 64721 PR REVISE MEDIAN N/CARPAL TUNNEL SURG: ICD-10-PCS | Mod: RT,,, | Performed by: ORTHOPAEDIC SURGERY

## 2019-08-08 PROCEDURE — 36000706: Mod: PO | Performed by: ORTHOPAEDIC SURGERY

## 2019-08-08 PROCEDURE — 64721 CARPAL TUNNEL SURGERY: CPT | Mod: RT,,, | Performed by: ORTHOPAEDIC SURGERY

## 2019-08-08 PROCEDURE — 71000015 HC POSTOP RECOV 1ST HR: Mod: PO | Performed by: ORTHOPAEDIC SURGERY

## 2019-08-08 PROCEDURE — D9220A PRA ANESTHESIA: ICD-10-PCS | Mod: CRNA,,, | Performed by: NURSE ANESTHETIST, CERTIFIED REGISTERED

## 2019-08-08 PROCEDURE — 63600175 PHARM REV CODE 636 W HCPCS: Mod: PO | Performed by: ORTHOPAEDIC SURGERY

## 2019-08-08 PROCEDURE — 25000003 PHARM REV CODE 250: Mod: PO | Performed by: ORTHOPAEDIC SURGERY

## 2019-08-08 PROCEDURE — D9220A PRA ANESTHESIA: Mod: CRNA,,, | Performed by: NURSE ANESTHETIST, CERTIFIED REGISTERED

## 2019-08-08 PROCEDURE — 36000707: Mod: PO | Performed by: ORTHOPAEDIC SURGERY

## 2019-08-08 RX ORDER — CEFAZOLIN SODIUM 2 G/50ML
2 SOLUTION INTRAVENOUS
Status: COMPLETED | OUTPATIENT
Start: 2019-08-08 | End: 2019-08-08

## 2019-08-08 RX ORDER — OXYCODONE HYDROCHLORIDE 5 MG/1
5 TABLET ORAL
Status: DISCONTINUED | OUTPATIENT
Start: 2019-08-08 | End: 2019-08-08 | Stop reason: HOSPADM

## 2019-08-08 RX ORDER — PROPOFOL 10 MG/ML
VIAL (ML) INTRAVENOUS
Status: DISCONTINUED | OUTPATIENT
Start: 2019-08-08 | End: 2019-08-08

## 2019-08-08 RX ORDER — SODIUM CHLORIDE, SODIUM LACTATE, POTASSIUM CHLORIDE, CALCIUM CHLORIDE 600; 310; 30; 20 MG/100ML; MG/100ML; MG/100ML; MG/100ML
INJECTION, SOLUTION INTRAVENOUS CONTINUOUS
Status: DISPENSED | OUTPATIENT
Start: 2019-08-08

## 2019-08-08 RX ORDER — ONDANSETRON 2 MG/ML
INJECTION INTRAMUSCULAR; INTRAVENOUS
Status: DISCONTINUED | OUTPATIENT
Start: 2019-08-08 | End: 2019-08-08

## 2019-08-08 RX ORDER — FENTANYL CITRATE 50 UG/ML
INJECTION, SOLUTION INTRAMUSCULAR; INTRAVENOUS
Status: DISCONTINUED | OUTPATIENT
Start: 2019-08-08 | End: 2019-08-08

## 2019-08-08 RX ORDER — FENTANYL CITRATE 50 UG/ML
25 INJECTION, SOLUTION INTRAMUSCULAR; INTRAVENOUS EVERY 5 MIN PRN
Status: DISCONTINUED | OUTPATIENT
Start: 2019-08-08 | End: 2019-08-08 | Stop reason: HOSPADM

## 2019-08-08 RX ORDER — MIDAZOLAM HYDROCHLORIDE 1 MG/ML
INJECTION, SOLUTION INTRAMUSCULAR; INTRAVENOUS
Status: DISCONTINUED | OUTPATIENT
Start: 2019-08-08 | End: 2019-08-08

## 2019-08-08 RX ORDER — SODIUM CHLORIDE 9 MG/ML
INJECTION, SOLUTION INTRAVENOUS CONTINUOUS
Status: DISCONTINUED | OUTPATIENT
Start: 2019-08-08 | End: 2019-08-08 | Stop reason: HOSPADM

## 2019-08-08 RX ORDER — LIDOCAINE HCL/PF 100 MG/5ML
SYRINGE (ML) INTRAVENOUS
Status: DISCONTINUED | OUTPATIENT
Start: 2019-08-08 | End: 2019-08-08

## 2019-08-08 RX ORDER — BUPIVACAINE HYDROCHLORIDE 2.5 MG/ML
INJECTION, SOLUTION EPIDURAL; INFILTRATION; INTRACAUDAL
Status: DISCONTINUED | OUTPATIENT
Start: 2019-08-08 | End: 2019-08-08 | Stop reason: HOSPADM

## 2019-08-08 RX ORDER — LIDOCAINE HYDROCHLORIDE 10 MG/ML
1 INJECTION, SOLUTION EPIDURAL; INFILTRATION; INTRACAUDAL; PERINEURAL ONCE
Status: ACTIVE | OUTPATIENT
Start: 2019-08-08

## 2019-08-08 RX ORDER — DEXAMETHASONE SODIUM PHOSPHATE 4 MG/ML
8 INJECTION, SOLUTION INTRA-ARTICULAR; INTRALESIONAL; INTRAMUSCULAR; INTRAVENOUS; SOFT TISSUE
Status: COMPLETED | OUTPATIENT
Start: 2019-08-08 | End: 2019-08-08

## 2019-08-08 RX ADMIN — MIDAZOLAM HYDROCHLORIDE 1 MG: 1 INJECTION, SOLUTION INTRAMUSCULAR; INTRAVENOUS at 07:08

## 2019-08-08 RX ADMIN — PROPOFOL 150 MG: 10 INJECTION, EMULSION INTRAVENOUS at 07:08

## 2019-08-08 RX ADMIN — LIDOCAINE HYDROCHLORIDE 100 MG: 20 INJECTION PARENTERAL at 07:08

## 2019-08-08 RX ADMIN — DEXAMETHASONE SODIUM PHOSPHATE 8 MG: 4 INJECTION, SOLUTION INTRAMUSCULAR; INTRAVENOUS at 07:08

## 2019-08-08 RX ADMIN — ONDANSETRON 4 MG: 2 INJECTION, SOLUTION INTRAMUSCULAR; INTRAVENOUS at 07:08

## 2019-08-08 RX ADMIN — FENTANYL CITRATE 50 MCG: 50 INJECTION, SOLUTION INTRAMUSCULAR; INTRAVENOUS at 07:08

## 2019-08-08 RX ADMIN — SODIUM CHLORIDE, SODIUM LACTATE, POTASSIUM CHLORIDE, AND CALCIUM CHLORIDE: .6; .31; .03; .02 INJECTION, SOLUTION INTRAVENOUS at 07:08

## 2019-08-08 RX ADMIN — CEFAZOLIN SODIUM 2 G: 2 SOLUTION INTRAVENOUS at 07:08

## 2019-08-08 NOTE — ANESTHESIA PREPROCEDURE EVALUATION
08/08/2019  Al Carter is a 69 y.o., male.    Anesthesia Evaluation    I have reviewed the Patient Summary Reports.    I have reviewed the Nursing Notes.   I have reviewed the Medications.     Review of Systems  Cardiovascular:   Hypertension CAD  CABG/stent     Musculoskeletal:  Spine Disorders: lumbar Disc disease    Neurological:   Denies Neuromuscular Disease.        Physical Exam  General:  Obesity    Airway/Jaw/Neck:  Airway Findings: Mouth Opening: Normal Tongue: Normal  General Airway Assessment: Adult  Mallampati: II  Improves to I with phonation.  Jaw/Neck Findings:  Neck ROM: Normal ROM      Dental:  Dental Findings: Upper Dentures, Lower Dentures, Edentulous        Mental Status:  Mental Status Findings:  Cooperative, Alert and Oriented         Anesthesia Plan  Type of Anesthesia, risks & benefits discussed:  Anesthesia Type:  general  Patient's Preference:   Intra-op Monitoring Plan: standard ASA monitors  Intra-op Monitoring Plan Comments:   Post Op Pain Control Plan: multimodal analgesia  Post Op Pain Control Plan Comments:   Induction:   IV and Inhalation  Beta Blocker:  Patient is not currently on a Beta-Blocker (No further documentation required).       Informed Consent: Patient understands risks and agrees with Anesthesia plan.  Questions answered. Anesthesia consent signed with patient.  ASA Score: 3     Day of Surgery Review of History & Physical:            Ready For Surgery From Anesthesia Perspective.

## 2019-08-08 NOTE — H&P
A 69 years old, recurrent pain in the right hand and wrist from carpal tunnel   syndrome, responded well to injection in the past, requesting an injection   today.  Also, having recurrence of symptoms in left shoulder consistent with   cuff tendinitis, cuff tear, requesting an injection here.  No signs of   infection.  I will inject the left shoulder today with Kenalog, right wrist   today with Kenalog for carpal tunnel syndrome.  We discussed with him surgical   release, which he is interested in.  He is aware of the risks and limitations   ____ proceed with carpal tunnel release on the right hand.        Further History  Aching pain  Worse with activity  Relieved with rest  No other associated symptoms  No other radiation    Further Exam  Alert and oriented  Pleasant  Contralateral limb has appropriate range of motion for age and condition  Contralateral limb has appropriate strength for age and condition  Contralateral limb has appropriate stability  for age and condition  No adenopathy  Pulses are appropriate for current condition  Skin is intact        Chief Complaint    No chief complaint on file.      KIARA Carter is a 69 y.o.  male who presents with       Past Medical History  Past Medical History:   Diagnosis Date    Anticoagulant long-term use     ASA 81 mg, Plavix    Arthritis     Knees and Hands    Cancer     Skin, face    Coronary artery disease     with 2 stents, lad, 2011    Hyperlipidemia     Melanoma     Squamous cell carcinoma        Past Surgical History  Past Surgical History:   Procedure Laterality Date    BACK SURGERY  1976, 1981    L5 Laminectomy, X 2    CARDIAC CATHETERIZATION  10/2011    2 stent LAD    CARDIAC SURGERY      COLONOSCOPY      EXCISION-CARCINOMA face Right 9/28/2015    Per[facility] at [facility] OR    JOINT REPLACEMENT Right     Knee, total    KNEE ARTHROSCOPY Left        Medications  Current Facility-Administered Medications   Medication     "0.9%  NaCl infusion    cefazolin (ANCEF) 2 gram in dextrose 5% 50 mL IVPB (premix)    dexamethasone injection 8 mg    lactated ringers infusion    lidocaine (PF) 10 mg/ml (1%) injection 10 mg       Allergies  Review of patient's allergies indicates:   Allergen Reactions    Crestor [rosuvastatin]     Lipitor [atorvastatin]     Statins-hmg-coa reductase inhibitors      Weakness and muscle pain       Family History  Family History   Problem Relation Age of Onset    Hypertension Sister     Diabetes Neg Hx        Social History  Social History     Socioeconomic History    Marital status:      Spouse name: Not on file    Number of children: Not on file    Years of education: Not on file    Highest education level: Not on file   Occupational History    Not on file   Social Needs    Financial resource strain: Not on file    Food insecurity:     Worry: Not on file     Inability: Not on file    Transportation needs:     Medical: Not on file     Non-medical: Not on file   Tobacco Use    Smoking status: Current Some Day Smoker     Packs/day: 2.00     Types: Cigarettes     Start date: 9/24/1964    Smokeless tobacco: Never Used   Substance and Sexual Activity    Alcohol use: Yes     Alcohol/week: 0.0 oz     Comment: "doesn't know when"    Drug use: No    Sexual activity: Not on file   Lifestyle    Physical activity:     Days per week: Not on file     Minutes per session: Not on file    Stress: Not on file   Relationships    Social connections:     Talks on phone: Not on file     Gets together: Not on file     Attends Scientologist service: Not on file     Active member of club or organization: Not on file     Attends meetings of clubs or organizations: Not on file     Relationship status: Not on file   Other Topics Concern    Not on file   Social History Narrative    Not on file               Review of Systems     Constitutional: Negative    HENT: Negative  Eyes: Negative  Respiratory: " Negative  Cardiovascular: Negative  Musculoskeletal: HPI  Skin: Negative  Neurological: Negative  Hematological: Negative  Endocrine: Negative                 Physical Exam    Vitals:    08/08/19 0726   BP: 125/60   Pulse: (!) 59   Resp: 18   Temp: 97.7 °F (36.5 °C)     Body mass index is 27.12 kg/m².  Physical Examination:     General appearance -  well appearing, and in no distress  Mental status - awake  Neck - supple  Chest -  symmetric air entry  Heart - normal rate   Abdomen - soft      Assessment     1. Right hand pain    2. Carpal tunnel syndrome of right wrist    3. Numbness and tingling of right hand    4. Carpal tunnel syndrome on right          Plan

## 2019-08-08 NOTE — BRIEF OP NOTE
[facility]  Brief Operative Note     SUMMARY     Surgery Date: 8/8/2019     Surgeon(s) and Role:     * Joe Wolfe MD - Primary    Assistant: none    Pre-op Diagnosis:  Right hand pain [M79.641]  Carpal tunnel syndrome of right wrist [G56.01]  Numbness and tingling of right hand [R20.0, R20.2]    Post-op Diagnosis:  Right hand pain [M79.641]  Carpal tunnel syndrome of right wrist [G56.01]  Numbness and tingling of right hand [R20.0, R20.2]    Procedure(s) (LRB):  RELEASE, CARPAL TUNNEL (Right)      Description of the findings of the procedure:  Right hand pain [M79.641]  Carpal tunnel syndrome of right wrist [G56.01]  Numbness and tingling of right hand [R20.0, R20.2]      Estimated Blood Loss: < 20CC         Specimens:   Specimen (12h ago, onward)    None          Discharge Note    SUMMARY     Admit Date: 8/8/2019    Discharge Date and Time: No discharge date for patient encounter.    Attending Physician: Joe Wolfe MD     Discharge Provider: Joe Wolfe    Final Diagnosis :Right hand pain [M79.641]  Carpal tunnel syndrome of right wrist [G56.01]  Numbness and tingling of right hand [R20.0, R20.2]    Outcome of Hospitalization, Treatment, Procedure, or Surgery:  Patient was admitted for an outpatient procedure and tolerated it well without complications.    Disposition: Home or Self care    Follow Up/Patient Instructions: The patient will be discharged home after meeting all discharge criteria. The patient will resume a diet as tolerated. Please follow post-operative instructions for activity restrictions. Keep previously planned post-operative follow -up appointment.      No discharge procedures on file.

## 2019-08-08 NOTE — ANESTHESIA POSTPROCEDURE EVALUATION
Anesthesia Post Evaluation    Patient: Al Carter    Procedure(s) Performed: Procedure(s) (LRB):  RELEASE, CARPAL TUNNEL (Right)    Final Anesthesia Type: general  Patient location during evaluation: PACU  Patient participation: Yes- Able to Participate  Level of consciousness: sedated and awake  Post-procedure vital signs: reviewed and stable  Pain management: adequate  Airway patency: patent  PONV status at discharge: No PONV  Anesthetic complications: no      Cardiovascular status: hypertensive and blood pressure returned to baseline  Respiratory status: spontaneous ventilation  Hydration status: euvolemic  Follow-up not needed.          Vitals Value Taken Time   /62 8/8/2019  8:27 AM   Temp 36.3 °C (97.3 °F) 8/8/2019  8:27 AM   Pulse 66 8/8/2019  8:27 AM   Resp 16 8/8/2019  8:27 AM   SpO2 95 % 8/8/2019  8:27 AM         Event Time     Out of Recovery 08:20:00          Pain/Jewels Score: Jewesl Score: 10 (8/8/2019  8:36 AM)

## 2019-08-08 NOTE — DISCHARGE SUMMARY
Discharge Note  Short Stay      SUMMARY     Admit Date: 8/8/2019    Attending Physician: Joe Wolfe MD     Discharge Physician: Joe Wolfe MD    Discharge Date: 8/8/2019 7:39 AM    Final Diagnosis: Right hand pain [M79.641]  Carpal tunnel syndrome of right wrist [G56.01]  Numbness and tingling of right hand [R20.0, R20.2]    Hospital Course: Patient tolerated procedure well.     Disposition: Home or Self Care    Patient Instructions:   Current Discharge Medication List      CONTINUE these medications which have NOT CHANGED    Details   !! clopidogrel (PLAVIX) 75 mg tablet TAKE 1 TABLET BY MOUTH EVERY DAY  Qty: 30 tablet, Refills: 0      indomethacin (INDOCIN) 25 MG capsule TAKE 1 CAPSULE(25 MG) BY MOUTH THREE TIMES DAILY WITH MEALS  Qty: 60 capsule, Refills: 1      traZODone (DESYREL) 100 MG tablet TAKE 1 TABLET(100 MG) BY MOUTH EVERY EVENING  Qty: 90 tablet, Refills: 3      !! clopidogrel (PLAVIX) 75 mg tablet TAKE 1 TABLET(75 MG) BY MOUTH EVERY DAY  Qty: 30 tablet, Refills: 0      nitroGLYCERIN (NITROSTAT) 0.4 MG SL tablet Place 0.4 mg under the tongue every 5 (five) minutes as needed.        oxyCODONE-acetaminophen (PERCOCET) 5-325 mg per tablet Take 1 tablet by mouth every 4 to 6 hours as needed for Pain.  Qty: 32 tablet, Refills: 0    Associated Diagnoses: S/P carpal tunnel release       !! - Potential duplicate medications found. Please discuss with provider.          Discharge Procedure Orders (must include Diet, Follow-up, Activity):   Discharge Procedure Orders (must include Diet, Follow-up, Activity)   Leave dressing on - Keep it clean, dry, and intact until clinic visit        Follow Up:  Follow up as scheduled.  Resume routine diet.  Activity as tolerated.    No driving day of procedure.

## 2019-08-08 NOTE — PLAN OF CARE
Patient tolerating oral liquids without difficulty. No apparent s&s of distress noted at this time, no complaints voiced at this time. Discharge instructions reviewed with patient and spouse with good verbal feedback received. Dressing to right hand clean, dry and intact. Pt denies pain. Patient ready for discharge

## 2019-08-08 NOTE — DISCHARGE INSTRUCTIONS
Discharge Instructions: After Your Surgery  Youve just had surgery. During surgery, you were given medicine called anesthesia to keep you relaxed and free of pain. After surgery, you may have some pain or nausea. This is common. Here are some tips for feeling better and getting well after surgery.     Stay on schedule with your medicine.   Going home  Your healthcare provider will show you how to take care of yourself when you go home. He or she will also answer your questions. Have an adult family member or friend drive you home. For the first 24 hours after your surgery:  · Do not drive or use heavy equipment.  · Do not make important decisions or sign legal papers.  · Do not drink alcohol.  · Have someone stay with you, if needed. He or she can watch for problems and help keep you safe.  Be sure to go to all follow-up visits with your healthcare provider. And rest after your surgery for as long as your healthcare provider tells you to.  Coping with pain  If you have pain after surgery, pain medicine will help you feel better. Take it as told, before pain becomes severe. Also, ask your healthcare provider or pharmacist about other ways to control pain. This might be with heat, ice, or relaxation. And follow any other instructions your surgeon or nurse gives you.  Tips for taking pain medicine  To get the best relief possible, remember these points:  · Pain medicines can upset your stomach. Taking them with a little food may help.  · Most pain relievers taken by mouth need at least 20 to 30 minutes to start to work.  · Taking medicine on a schedule can help you remember to take it. Try to time your medicine so that you can take it before starting an activity. This might be before you get dressed, go for a walk, or sit down for dinner.  · Constipation is a common side effect of pain medicines. Call your healthcare provider before taking any medicines such as laxatives or stool softeners to help ease constipation.  Also ask if you should skip any foods. Drinking lots of fluids and eating foods such as fruits and vegetables that are high in fiber can also help. Remember, do not take laxatives unless your surgeon has prescribed them.  · Drinking alcohol and taking pain medicine can cause dizziness and slow your breathing. It can even be deadly. Do not drink alcohol while taking pain medicine.  · Pain medicine can make you react more slowly to things. Do not drive or run machinery while taking pain medicine.  Your healthcare provider may tell you to take acetaminophen to help ease your pain. Ask him or her how much you are supposed to take each day. Acetaminophen or other pain relievers may interact with your prescription medicines or other over-the-counter (OTC) medicines. Some prescription medicines have acetaminophen and other ingredients. Using both prescription and OTC acetaminophen for pain can cause you to overdose. Read the labels on your OTC medicines with care. This will help you to clearly know the list of ingredients, how much to take, and any warnings. It may also help you not take too much acetaminophen. If you have questions or do not understand the information, ask your pharmacist or healthcare provider to explain it to you before you take the OTC medicine.  Managing nausea  Some people have an upset stomach after surgery. This is often because of anesthesia, pain, or pain medicine, or the stress of surgery. These tips will help you handle nausea and eat healthy foods as you get better. If you were on a special food plan before surgery, ask your healthcare provider if you should follow it while you get better. These tips may help:  · Do not push yourself to eat. Your body will tell you when to eat and how much.  · Start off with clear liquids and soup. They are easier to digest.  · Next try semi-solid foods, such as mashed potatoes, applesauce, and gelatin, as you feel ready.  · Slowly move to solid foods. Dont  eat fatty, rich, or spicy foods at first.  · Do not force yourself to have 3 large meals a day. Instead eat smaller amounts more often.  · Take pain medicines with a small amount of solid food, such as crackers or toast, to avoid nausea.     Call your surgeon if  · You still have pain an hour after taking medicine. The medicine may not be strong enough.  · You feel too sleepy, dizzy, or groggy. The medicine may be too strong.  · You have side effects like nausea, vomiting, or skin changes, such as rash, itching, or hives.       If you have obstructive sleep apnea  You were given anesthesia medicine during surgery to keep you comfortable and free of pain. After surgery, you may have more apnea spells because of this medicine and other medicines you were given. The spells may last longer than usual.   At home:  · Keep using the continuous positive airway pressure (CPAP) device when you sleep. Unless your healthcare provider tells you not to, use it when you sleep, day or night. CPAP is a common device used to treat obstructive sleep apnea.  · Talk with your provider before taking any pain medicine, muscle relaxants, or sedatives. Your provider will tell you about the possible dangers of taking these medicines.  Date Last Reviewed: 12/1/2016 © 2000-2017 [employer] [address] [place] [address]. All rights reserved. This information is not intended as a substitute for professional medical care. Always follow your healthcare professional's instructions    AFTER HAND SURGERY    DOS:   Keep affected wrist elevated above the level of your heart   Check circulation frequently in fingers by pressing on the nail bed. Nail bed should turn white and then pink when released.   Exercise fingers to promote circulation.   Advance diet as tolerated   Resume home medications ________________________  Dequeverains Release/CTR  Keep splint dressing clean and dry for two weeks by covering it with 2  plastic bags secured with rubber bands above your dressing.  Carpal Tunnel/Trigger Finger Release  Remove dressing in three days.    DONT:   No driving for 24 hours or while taking narcotic pain medication   DO NOT TAKE ADDITIONAL TYLENOL/ACETAMINOPHEN WHILE TAKING NARCOTIC PAIN MEDICATION THAT CONTAINS TYLENOL/ACETAMINOPHEN.    CALL PHYSICIAN FOR:   Obvious bleeding   Excessive swelling   Drainage (pus) from the wound   Pain unrelieved by pain medication.  Make return appointment for ________________________________  FOR EMERGENCIES:  Contact  _____________________ at [phone]

## 2019-08-08 NOTE — OP NOTE
[facility]  Operative Note - Carpal Tunnel        DATE OF PROCEDURE: 8/8/2019      PROCEDURE: right  carpal tunnel release.     PREOPERATIVE DIAGNOSIS: Right hand pain [M79.641]  Carpal tunnel syndrome of right wrist [G56.01]  Numbness and tingling of right hand [R20.0, R20.2]    POSTOPERATIVE DIAGNOSIS: Right hand pain [M79.641]  Carpal tunnel syndrome of right wrist [G56.01]  Numbness and tingling of right hand [R20.0, R20.2]    SURGEON: Joe Wolfe M.D.    Assistant: None     ANESTHESIA: General    ESTIMATED BLOOD LOSS: < 20cc    FLUIDS: Crystalloid.     DRAINS: None.     TOURNIQUET TIME: Approximately 10 minutes at 250 mmHg.    Specimens:   Specimen (12h ago, onward)    None              INDICATIONS FOR PROCEDURE: Al Carter is a 69 y.o. year-old male  with right Carpal Tunnel syndrome. The patient has had signs and symptoms of carpal tunnel syndrome for quite some time. It has been nonresponsive to nonoperative measures. It was felt that the patient would benefit from carpal tunnel release.   PROCEDURE IN DETAIL: After obtaining informed consent and starting the patient   on preoperative IV antibiotics, the patient was taken back to the operating   room. General anesthesia was administered by the anesthesia team. The right upper   extremity was prepped with DuraPrep and draped in a normal sterile fashion. An   Esmarch bandage was used to exsanguinate the upper extremity and the   pneumatic tourniquet was inflated to 250 mmHg. An approximately 3 cm   longitudinal incision was made in line with the radial border of the fourth   digit beginning at the distal wrist flexion crease and extending distally   approximately 3 cm. Full thickness skin flaps were raised. The palmar fascia   was identified and split the length of the skin incision. A hemostat retractor   was then placed deep to the transverse carpal ligament. This was then cut using   a Salamatof blade scalpel from distal to proximal,  the most proximal portion of   which was cut with Metzenbaum scissors in a push fashion under direct   visualization. I was able to place my small finger into the distal aspect of   the forearm. I was satisfied with the release. The wound was then irrigated   with saline solution. The skin was closed with 4-0 nylon stitches in a   horizontal mattress fashion. The wound was then injected with approximately 3   mL of quarter percent Marcaine plain. A sterile dressing was applied.  The pneumatic tourniquet was then deflated.  This concluded the operative procedure.

## 2019-08-23 ENCOUNTER — OFFICE VISIT (OUTPATIENT)
Dept: ORTHOPEDICS | Facility: CLINIC | Age: 69
End: 2019-08-23
Payer: MEDICARE

## 2019-08-23 VITALS — WEIGHT: 200 LBS | HEIGHT: 72 IN | BODY MASS INDEX: 27.09 KG/M2

## 2019-08-23 DIAGNOSIS — Z98.890 S/P CARPAL TUNNEL RELEASE: Primary | ICD-10-CM

## 2019-08-23 DIAGNOSIS — G89.18 POST-OP PAIN: ICD-10-CM

## 2019-08-23 PROCEDURE — 99024 POSTOP FOLLOW-UP VISIT: CPT | Mod: S$GLB,,, | Performed by: ORTHOPAEDIC SURGERY

## 2019-08-23 PROCEDURE — 99024 PR POST-OP FOLLOW-UP VISIT: ICD-10-PCS | Mod: S$GLB,,, | Performed by: ORTHOPAEDIC SURGERY

## 2019-08-23 PROCEDURE — 99999 PR PBB SHADOW E&M-EST. PATIENT-LVL II: CPT | Mod: PBBFAC,,, | Performed by: ORTHOPAEDIC SURGERY

## 2019-08-23 PROCEDURE — 99999 PR PBB SHADOW E&M-EST. PATIENT-LVL II: ICD-10-PCS | Mod: PBBFAC,,, | Performed by: ORTHOPAEDIC SURGERY

## 2019-08-26 RX ORDER — CLOPIDOGREL BISULFATE 75 MG/1
TABLET ORAL
Qty: 30 TABLET | Refills: 0 | Status: SHIPPED | OUTPATIENT
Start: 2019-08-26 | End: 2019-09-23 | Stop reason: SDUPTHER

## 2019-09-23 RX ORDER — CLOPIDOGREL BISULFATE 75 MG/1
TABLET ORAL
Qty: 30 TABLET | Refills: 0 | Status: SHIPPED | OUTPATIENT
Start: 2019-09-23 | End: 2019-10-23 | Stop reason: SDUPTHER

## 2019-09-23 RX ORDER — INDOMETHACIN 25 MG/1
CAPSULE ORAL
Qty: 60 CAPSULE | Refills: 0 | Status: SHIPPED | OUTPATIENT
Start: 2019-09-23 | End: 2019-10-23 | Stop reason: SDUPTHER

## 2019-10-01 DIAGNOSIS — G89.29 CHRONIC PAIN OF BOTH SHOULDERS: Primary | ICD-10-CM

## 2019-10-01 DIAGNOSIS — M25.512 CHRONIC PAIN OF BOTH SHOULDERS: Primary | ICD-10-CM

## 2019-10-01 DIAGNOSIS — M25.511 CHRONIC PAIN OF BOTH SHOULDERS: Primary | ICD-10-CM

## 2019-10-02 ENCOUNTER — HOSPITAL ENCOUNTER (OUTPATIENT)
Dept: RADIOLOGY | Facility: HOSPITAL | Age: 69
Discharge: HOME | End: 2019-10-02
Attending: ORTHOPAEDIC SURGERY
Payer: MEDICARE

## 2019-10-02 ENCOUNTER — OFFICE VISIT (OUTPATIENT)
Dept: ORTHOPEDICS | Facility: CLINIC | Age: 69
End: 2019-10-02
Payer: MEDICARE

## 2019-10-02 VITALS — WEIGHT: 200 LBS | HEIGHT: 72 IN | BODY MASS INDEX: 27.09 KG/M2

## 2019-10-02 DIAGNOSIS — G89.29 CHRONIC PAIN OF BOTH SHOULDERS: Primary | ICD-10-CM

## 2019-10-02 DIAGNOSIS — M19.011 PRIMARY OSTEOARTHRITIS OF BOTH SHOULDERS: ICD-10-CM

## 2019-10-02 DIAGNOSIS — M25.512 CHRONIC PAIN OF BOTH SHOULDERS: ICD-10-CM

## 2019-10-02 DIAGNOSIS — M25.512 CHRONIC PAIN OF BOTH SHOULDERS: Primary | ICD-10-CM

## 2019-10-02 DIAGNOSIS — G89.29 CHRONIC PAIN OF BOTH SHOULDERS: ICD-10-CM

## 2019-10-02 DIAGNOSIS — M25.511 CHRONIC PAIN OF BOTH SHOULDERS: ICD-10-CM

## 2019-10-02 DIAGNOSIS — M25.511 CHRONIC PAIN OF BOTH SHOULDERS: Primary | ICD-10-CM

## 2019-10-02 DIAGNOSIS — M19.012 PRIMARY OSTEOARTHRITIS OF BOTH SHOULDERS: ICD-10-CM

## 2019-10-02 PROCEDURE — 20610 LARGE JOINT ASPIRATION/INJECTION: L SUBACROMIAL BURSA: ICD-10-PCS | Mod: 79,LT,S$GLB, | Performed by: ORTHOPAEDIC SURGERY

## 2019-10-02 PROCEDURE — 99999 PR PBB SHADOW E&M-EST. PATIENT-LVL II: ICD-10-PCS | Mod: PBBFAC,,, | Performed by: ORTHOPAEDIC SURGERY

## 2019-10-02 PROCEDURE — 73030 X-RAY EXAM OF SHOULDER: CPT | Mod: 26,50,, | Performed by: RADIOLOGY

## 2019-10-02 PROCEDURE — 99214 PR OFFICE/OUTPT VISIT, EST, LEVL IV, 30-39 MIN: ICD-10-PCS | Mod: 24,25,S$GLB, | Performed by: ORTHOPAEDIC SURGERY

## 2019-10-02 PROCEDURE — 1101F PT FALLS ASSESS-DOCD LE1/YR: CPT | Mod: CPTII,S$GLB,, | Performed by: ORTHOPAEDIC SURGERY

## 2019-10-02 PROCEDURE — 99999 PR PBB SHADOW E&M-EST. PATIENT-LVL II: CPT | Mod: PBBFAC,,, | Performed by: ORTHOPAEDIC SURGERY

## 2019-10-02 PROCEDURE — 20610 DRAIN/INJ JOINT/BURSA W/O US: CPT | Mod: 79,LT,S$GLB, | Performed by: ORTHOPAEDIC SURGERY

## 2019-10-02 PROCEDURE — 99214 OFFICE O/P EST MOD 30 MIN: CPT | Mod: 24,25,S$GLB, | Performed by: ORTHOPAEDIC SURGERY

## 2019-10-02 PROCEDURE — 73030 X-RAY EXAM OF SHOULDER: CPT | Mod: TC,50,PO

## 2019-10-02 PROCEDURE — 1101F PR PT FALLS ASSESS DOC 0-1 FALLS W/OUT INJ PAST YR: ICD-10-PCS | Mod: CPTII,S$GLB,, | Performed by: ORTHOPAEDIC SURGERY

## 2019-10-02 PROCEDURE — 73030 XR SHOULDER TRAUMA 3 VIEW BILATERAL: ICD-10-PCS | Mod: 26,50,, | Performed by: RADIOLOGY

## 2019-10-02 RX ORDER — TRIAMCINOLONE ACETONIDE 40 MG/ML
80 INJECTION, SUSPENSION INTRA-ARTICULAR; INTRAMUSCULAR
Status: DISCONTINUED | OUTPATIENT
Start: 2019-10-02 | End: 2019-10-02 | Stop reason: HOSPADM

## 2019-10-02 RX ADMIN — TRIAMCINOLONE ACETONIDE 80 MG: 40 INJECTION, SUSPENSION INTRA-ARTICULAR; INTRAMUSCULAR at 09:10

## 2019-10-02 NOTE — PROGRESS NOTES
Dictation #1  MRN:[id number]  [facility]:[id number]  Further History  Aching pain  Worse with activity  Relieved with rest  No other associated symptoms  No other radiation    Further Exam  Alert and oriented  Pleasant  Contralateral limb has appropriate range of motion for age and condition  Contralateral limb has appropriate strength for age and condition  Contralateral limb has appropriate stability  for age and condition  No adenopathy  Pulses are appropriate for current condition  Skin is intact        Chief Complaint    Chief Complaint   Patient presents with    Right Shoulder - Pain    Left Shoulder - Pain       HPI  Al Carter is a 69 y.o.  male who presents with       Past Medical History  Past Medical History:   Diagnosis Date    Anticoagulant long-term use     ASA 81 mg, Plavix    Arthritis     Knees and Hands    Cancer     Skin, face    Coronary artery disease     with 2 stents, lad, 2011    Hyperlipidemia     Melanoma     Squamous cell carcinoma        Past Surgical History  Past Surgical History:   Procedure Laterality Date    BACK SURGERY  1976, 1981    L5 Laminectomy, X 2    CARDIAC CATHETERIZATION  10/2011    2 stent LAD    CARDIAC SURGERY      CARPAL TUNNEL RELEASE Right 8/8/2019    Procedure: RELEASE, CARPAL TUNNEL;  Surgeon: Joe Wolfe MD;  Location: [facility];  Service: Orthopedics;  Laterality: Right;    COLONOSCOPY      JOINT REPLACEMENT Right     Knee, total    KNEE ARTHROSCOPY Left        Medications  Current Outpatient Medications   Medication Sig    clopidogrel (PLAVIX) 75 mg tablet TAKE 1 TABLET BY MOUTH EVERY DAY    clopidogrel (PLAVIX) 75 mg tablet TAKE 1 TABLET(75 MG) BY MOUTH EVERY DAY    indomethacin (INDOCIN) 25 MG capsule TAKE 1 CAPSULE(25 MG) BY MOUTH THREE TIMES DAILY WITH MEALS    nitroGLYCERIN (NITROSTAT) 0.4 MG SL tablet Place 0.4 mg under the tongue every 5 (five) minutes as needed.      oxyCODONE-acetaminophen (PERCOCET) 5-325 mg per tablet Take 1 tablet by  "mouth every 4 to 6 hours as needed for Pain.    traZODone (DESYREL) 100 MG tablet TAKE 1 TABLET(100 MG) BY MOUTH EVERY EVENING     No current facility-administered medications for this visit.      Facility-Administered Medications Ordered in Other Visits   Medication    lactated ringers infusion    lidocaine (PF) 10 mg/ml (1%) injection 10 mg       Allergies  Review of patient's allergies indicates:   Allergen Reactions    Crestor [rosuvastatin]     Lipitor [atorvastatin]     Statins-hmg-coa reductase inhibitors      Weakness and muscle pain       Family History  Family History   Problem Relation Age of Onset    Hypertension Sister     Diabetes Neg Hx        Social History  Social History     Socioeconomic History    Marital status:      Spouse name: Not on file    Number of children: Not on file    Years of education: Not on file    Highest education level: Not on file   Occupational History    Not on file   Social Needs    Financial resource strain: Not on file    Food insecurity:     Worry: Not on file     Inability: Not on file    Transportation needs:     Medical: Not on file     Non-medical: Not on file   Tobacco Use    Smoking status: Current Some Day Smoker     Packs/day: 2.00     Types: Cigarettes     Start date: 9/24/1964    Smokeless tobacco: Never Used   Substance and Sexual Activity    Alcohol use: Yes     Alcohol/week: 0.0 standard drinks     Comment: "doesn't know when"    Drug use: No    Sexual activity: Not on file   Lifestyle    Physical activity:     Days per week: Not on file     Minutes per session: Not on file    Stress: Not on file   Relationships    Social connections:     Talks on phone: Not on file     Gets together: Not on file     Attends Taoism service: Not on file     Active member of club or organization: Not on file     Attends meetings of clubs or organizations: Not on file     Relationship status: Not on file   Other Topics Concern    Not on file "   Social History Narrative    Not on file               Review of Systems     Constitutional: Negative    HENT: Negative  Eyes: Negative  Respiratory: Negative  Cardiovascular: Negative  Musculoskeletal: HPI  Skin: Negative  Neurological: Negative  Hematological: Negative  Endocrine: Negative                 Physical Exam    There were no vitals filed for this visit.  Body mass index is 27.12 kg/m².  Physical Examination:     General appearance -  well appearing, and in no distress  Mental status - awake  Neck - supple  Chest -  symmetric air entry  Heart - normal rate   Abdomen - soft      Assessment     1. Chronic pain of both shoulders    2. Primary osteoarthritis of both shoulders          Plan

## 2019-10-02 NOTE — PROCEDURES
Large Joint Aspiration/Injection: L subacromial bursa  Date/Time: 10/2/2019 9:00 AM  Performed by: Joe Wolfe MD  Authorized by: Joe Wolfe MD     Consent Done?:  Yes (Verbal)  Indications:  Pain  Procedure site marked: Yes    Timeout: Prior to procedure the correct patient, procedure, and site was verified      Location:  Shoulder  Site:  L subacromial bursa  Prep: Patient was prepped and draped in usual sterile fashion    Needle size:  21 G  Ultrasonic Guidance for needle placement: No  Approach:  Posterior  Medications:  80 mg triamcinolone acetonide 40 mg/mL  Patient tolerance:  Patient tolerated the procedure well with no immediate complications

## 2019-10-03 NOTE — PROGRESS NOTES
HISTORY OF PRESENT ILLNESS:  A 69 years old with recurrent left shoulder pain.    We gave him an injection about a year and a half ago.  He responded well,   requesting repeat injection.    Exam shows positive Neer, Valdes and impingement sign, weak and painful cuff   strength.    ASSESSMENT:  Left shoulder cuff tendinitis, possible cuff tear.    PLAN:  Kenalog injection into the subacromial space of the left shoulder.    Encouraged cuff strengthening exercises.  Follow up with me as needed.      DANIELLE  dd: 10/02/2019 09:13:24 (CDT)  td: 10/02/2019 23:38:42 (CDT)  Doc ID   #[id number]  Job ID #[id number]    CC:

## 2019-10-23 RX ORDER — INDOMETHACIN 25 MG/1
CAPSULE ORAL
Qty: 60 CAPSULE | Refills: 3 | Status: SHIPPED | OUTPATIENT
Start: 2019-10-23 | End: 2020-02-19

## 2019-10-23 RX ORDER — CLOPIDOGREL BISULFATE 75 MG/1
TABLET ORAL
Qty: 30 TABLET | Refills: 0 | Status: SHIPPED | OUTPATIENT
Start: 2019-10-23 | End: 2019-11-22 | Stop reason: SDUPTHER

## 2019-11-23 RX ORDER — TRAZODONE HYDROCHLORIDE 100 MG/1
TABLET ORAL
Qty: 90 TABLET | Refills: 0 | Status: SHIPPED | OUTPATIENT
Start: 2019-11-23 | End: 2020-02-20

## 2019-11-25 RX ORDER — CLOPIDOGREL BISULFATE 75 MG/1
TABLET ORAL
Qty: 30 TABLET | Refills: 0 | Status: SHIPPED | OUTPATIENT
Start: 2019-11-25 | End: 2019-12-17 | Stop reason: SDUPTHER

## 2019-12-17 RX ORDER — CLOPIDOGREL BISULFATE 75 MG/1
TABLET ORAL
Qty: 30 TABLET | Refills: 0 | Status: SHIPPED | OUTPATIENT
Start: 2019-12-17 | End: 2020-01-15

## 2020-01-15 RX ORDER — CLOPIDOGREL BISULFATE 75 MG/1
TABLET ORAL
Qty: 30 TABLET | Refills: 0 | Status: SHIPPED | OUTPATIENT
Start: 2020-01-15 | End: 2020-06-29

## 2020-02-19 ENCOUNTER — PATIENT OUTREACH (OUTPATIENT)
Dept: ADMINISTRATIVE | Facility: OTHER | Age: 70
End: 2020-02-19

## 2020-02-19 DIAGNOSIS — I10 ESSENTIAL HYPERTENSION: Primary | ICD-10-CM

## 2020-02-19 DIAGNOSIS — Z12.5 SCREENING FOR PROSTATE CANCER: ICD-10-CM

## 2020-02-19 RX ORDER — INDOMETHACIN 25 MG/1
CAPSULE ORAL
Qty: 60 CAPSULE | Refills: 3 | Status: SHIPPED | OUTPATIENT
Start: 2020-02-19 | End: 2020-07-24 | Stop reason: SDUPTHER

## 2020-02-20 RX ORDER — TRAZODONE HYDROCHLORIDE 100 MG/1
TABLET ORAL
Qty: 90 TABLET | Refills: 0 | Status: SHIPPED | OUTPATIENT
Start: 2020-02-20 | End: 2020-05-22

## 2020-02-20 NOTE — TELEPHONE ENCOUNTER
Called pt in regards to rx refill notification and scheduling a follow up appt and labs. No answer. LVM to return phone call to clinic to schedule appts.

## 2020-05-22 RX ORDER — TRAZODONE HYDROCHLORIDE 100 MG/1
TABLET ORAL
Qty: 90 TABLET | Refills: 0 | Status: SHIPPED | OUTPATIENT
Start: 2020-05-22 | End: 2020-07-24 | Stop reason: SDUPTHER

## 2020-05-28 NOTE — TELEPHONE ENCOUNTER
Spoke w/ pt. Attempted to sched lab and appt. Pt decline, said he will call back when he's ready. He is in process of moving today. Reminded pt that he will need to sched these prior to any future rx refills. Pt verbalized understanding.

## 2020-06-19 ENCOUNTER — PATIENT OUTREACH (OUTPATIENT)
Dept: ADMINISTRATIVE | Facility: HOSPITAL | Age: 70
End: 2020-06-19

## 2020-06-19 NOTE — PROGRESS NOTES
Chart review completed 06/19/2020.  Care Everywhere updates requested and reviewed.  Immunizations reconciled. Media reviewed.     Health Maintenance Due   Topic Date Due    Shingles Vaccine (1 of 2) 01/11/1999    Pneumococcal Vaccine (65+ High/Highest Risk) (1 of 2 - PCV13) 01/11/2014    Lipid Panel  03/18/2020

## 2020-06-29 ENCOUNTER — OFFICE VISIT (OUTPATIENT)
Dept: FAMILY MEDICINE | Facility: CLINIC | Age: 70
End: 2020-06-29
Payer: MEDICARE

## 2020-06-29 ENCOUNTER — HOSPITAL ENCOUNTER (OUTPATIENT)
Dept: RADIOLOGY | Facility: HOSPITAL | Age: 70
Discharge: HOME | End: 2020-06-29
Attending: FAMILY MEDICINE
Payer: MEDICARE

## 2020-06-29 VITALS
OXYGEN SATURATION: 95 % | HEART RATE: 55 BPM | SYSTOLIC BLOOD PRESSURE: 136 MMHG | DIASTOLIC BLOOD PRESSURE: 72 MMHG | WEIGHT: 202.81 LBS | HEIGHT: 72 IN | TEMPERATURE: 97 F | BODY MASS INDEX: 27.47 KG/M2

## 2020-06-29 DIAGNOSIS — Z00.00 HEALTH MAINTENANCE EXAMINATION: ICD-10-CM

## 2020-06-29 DIAGNOSIS — M16.11 ARTHRITIS OF RIGHT HIP: ICD-10-CM

## 2020-06-29 DIAGNOSIS — L72.3 SEBACEOUS CYST: ICD-10-CM

## 2020-06-29 DIAGNOSIS — Z12.5 SCREENING FOR PROSTATE CANCER: ICD-10-CM

## 2020-06-29 DIAGNOSIS — Z12.11 COLON CANCER SCREENING: Primary | ICD-10-CM

## 2020-06-29 PROCEDURE — 99397 PR PREVENTIVE VISIT,EST,65 & OVER: ICD-10-PCS | Mod: S$GLB,,, | Performed by: FAMILY MEDICINE

## 2020-06-29 PROCEDURE — 99397 PER PM REEVAL EST PAT 65+ YR: CPT | Mod: S$GLB,,, | Performed by: FAMILY MEDICINE

## 2020-06-29 PROCEDURE — 73502 XR HIP 2 VIEW RIGHT: ICD-10-PCS | Mod: 26,RT,, | Performed by: RADIOLOGY

## 2020-06-29 PROCEDURE — 3075F SYST BP GE 130 - 139MM HG: CPT | Mod: CPTII,S$GLB,, | Performed by: FAMILY MEDICINE

## 2020-06-29 PROCEDURE — 3075F PR MOST RECENT SYSTOLIC BLOOD PRESS GE 130-139MM HG: ICD-10-PCS | Mod: CPTII,S$GLB,, | Performed by: FAMILY MEDICINE

## 2020-06-29 PROCEDURE — 3078F DIAST BP <80 MM HG: CPT | Mod: CPTII,S$GLB,, | Performed by: FAMILY MEDICINE

## 2020-06-29 PROCEDURE — 73502 X-RAY EXAM HIP UNI 2-3 VIEWS: CPT | Mod: TC,PN,RT

## 2020-06-29 PROCEDURE — 3078F PR MOST RECENT DIASTOLIC BLOOD PRESSURE < 80 MM HG: ICD-10-PCS | Mod: CPTII,S$GLB,, | Performed by: FAMILY MEDICINE

## 2020-06-29 PROCEDURE — 73502 X-RAY EXAM HIP UNI 2-3 VIEWS: CPT | Mod: 26,RT,, | Performed by: RADIOLOGY

## 2020-06-29 PROCEDURE — 99999 PR PBB SHADOW E&M-EST. PATIENT-LVL IV: CPT | Mod: PBBFAC,,, | Performed by: FAMILY MEDICINE

## 2020-06-29 PROCEDURE — 99999 PR PBB SHADOW E&M-EST. PATIENT-LVL IV: ICD-10-PCS | Mod: PBBFAC,,, | Performed by: FAMILY MEDICINE

## 2020-06-29 NOTE — PROGRESS NOTES
Subjective:       Patient ID: Al Carter is a 70 y.o. male.    Chief Complaint: Annual Exam    Annual exam.  History of hypertension but not requiring medication.  He had a fall 7 months ago landing on the right hip.  He has persistent right hip pain.  Painful to get in and out of a car.  Painful to walk.  Previous right knee replacement.  He has some skin lesions that he would like to be checked.  He is due for colon cancer screening.  Also due for PSA and other lab work.  Past Medical History, Surgical History, Family History, Social History, Medications and Allergies reviewed.       Review of Systems   Constitutional: Negative for fatigue, fever and unexpected weight change.   HENT: Negative.    Eyes: Negative for visual disturbance.   Respiratory: Negative for cough, shortness of breath and wheezing.    Cardiovascular: Negative for chest pain, palpitations and leg swelling.   Gastrointestinal: Negative for abdominal pain, blood in stool and diarrhea.   Genitourinary: Negative for difficulty urinating and hematuria.   Musculoskeletal: Positive for arthralgias and back pain.   Integumentary:         No neoplasms    Neurological: Negative for weakness and numbness.         Objective:     Blood pressure 136/72, pulse (!) 55, temperature 97.3 °F (36.3 °C), temperature source Oral, height 6' (1.829 m), weight 92 kg (202 lb 13.2 oz), SpO2 95 %.      Physical Exam  Constitutional:       General: He is not in acute distress.     Appearance: Normal appearance. He is not ill-appearing.   HENT:      Head:      Comments: No oral lesions.  Nose is clear.  TMs are clear.  Eyes:      Conjunctiva/sclera: Conjunctivae normal.   Cardiovascular:      Rate and Rhythm: Normal rate and regular rhythm.      Pulses: Normal pulses.      Heart sounds: Murmur present.   Pulmonary:      Effort: Pulmonary effort is normal. No respiratory distress.      Breath sounds: Normal breath sounds.   Abdominal:      General: Bowel sounds are  normal.      Palpations: Abdomen is soft. There is no mass.   Musculoskeletal:         General: No swelling.      Comments: Right hip with pain at 90 flexion.  Painful restriction with internal rotation abduction and adduction.  Greater trochanter is nontender.  Gluteal area nontender.  Sacroiliac nontender.   Lymphadenopathy:      Cervical: No cervical adenopathy.   Skin:     Comments: Multiple seborrheic keratoses sees including a large irregular 1 on his scalp.  The surface is rough.  He also has a 8 mm inclusion cyst with a central black and on his back.  That was evacuated with pressure.   Neurological:      Mental Status: He is alert.   Psychiatric:         Mood and Affect: Mood normal.         Assessment:       1. Colon cancer screening    2. Arthritis of right hip    3. Health maintenance examination    4. Screening for prostate cancer    5. Sebaceous cyst        Plan:       Schedule fasting lab work.  Fit kit.  X-ray of the right hip.  Ortho consult.

## 2020-07-01 ENCOUNTER — PATIENT OUTREACH (OUTPATIENT)
Dept: ADMINISTRATIVE | Facility: OTHER | Age: 70
End: 2020-07-01

## 2020-07-06 ENCOUNTER — LAB VISIT (OUTPATIENT)
Dept: LAB | Facility: HOSPITAL | Age: 70
End: 2020-07-06
Attending: FAMILY MEDICINE
Payer: MEDICARE

## 2020-07-06 ENCOUNTER — OFFICE VISIT (OUTPATIENT)
Dept: ORTHOPEDICS | Facility: CLINIC | Age: 70
End: 2020-07-06
Payer: MEDICARE

## 2020-07-06 VITALS — BODY MASS INDEX: 27.47 KG/M2 | HEIGHT: 72 IN | WEIGHT: 202.81 LBS

## 2020-07-06 DIAGNOSIS — M16.11 ARTHRITIS OF RIGHT HIP: ICD-10-CM

## 2020-07-06 DIAGNOSIS — Z12.5 SCREENING FOR PROSTATE CANCER: ICD-10-CM

## 2020-07-06 DIAGNOSIS — I10 ESSENTIAL HYPERTENSION: ICD-10-CM

## 2020-07-06 LAB
ALBUMIN SERPL BCP-MCNC: 4 G/DL (ref 3.5–5.2)
ALP SERPL-CCNC: 54 U/L (ref 55–135)
ALT SERPL W/O P-5'-P-CCNC: 16 U/L (ref 10–44)
ANION GAP SERPL CALC-SCNC: 7 MMOL/L (ref 8–16)
AST SERPL-CCNC: 17 U/L (ref 10–40)
BILIRUB SERPL-MCNC: 0.6 MG/DL (ref 0.1–1)
BUN SERPL-MCNC: 17 MG/DL (ref 8–23)
CALCIUM SERPL-MCNC: 8.9 MG/DL (ref 8.7–10.5)
CHLORIDE SERPL-SCNC: 107 MMOL/L (ref 95–110)
CHOLEST SERPL-MCNC: 209 MG/DL (ref 120–199)
CHOLEST/HDLC SERPL: 5.8 {RATIO} (ref 2–5)
CO2 SERPL-SCNC: 24 MMOL/L (ref 23–29)
CREAT SERPL-MCNC: 1 MG/DL (ref 0.5–1.4)
EST. GFR  (AFRICAN AMERICAN): >60 ML/MIN/1.73 M^2
EST. GFR  (NON AFRICAN AMERICAN): >60 ML/MIN/1.73 M^2
GLUCOSE SERPL-MCNC: 107 MG/DL (ref 70–110)
HDLC SERPL-MCNC: 36 MG/DL (ref 40–75)
HDLC SERPL: 17.2 % (ref 20–50)
LDLC SERPL CALC-MCNC: 153.2 MG/DL (ref 63–159)
NONHDLC SERPL-MCNC: 173 MG/DL
POTASSIUM SERPL-SCNC: 4.2 MMOL/L (ref 3.5–5.1)
PROT SERPL-MCNC: 7 G/DL (ref 6–8.4)
SODIUM SERPL-SCNC: 138 MMOL/L (ref 136–145)
TRIGL SERPL-MCNC: 99 MG/DL (ref 30–150)

## 2020-07-06 PROCEDURE — 1125F PR PAIN SEVERITY QUANTIFIED, PAIN PRESENT: ICD-10-PCS | Mod: S$GLB,,, | Performed by: ORTHOPAEDIC SURGERY

## 2020-07-06 PROCEDURE — 3008F BODY MASS INDEX DOCD: CPT | Mod: CPTII,S$GLB,, | Performed by: ORTHOPAEDIC SURGERY

## 2020-07-06 PROCEDURE — 1101F PR PT FALLS ASSESS DOC 0-1 FALLS W/OUT INJ PAST YR: ICD-10-PCS | Mod: CPTII,S$GLB,, | Performed by: ORTHOPAEDIC SURGERY

## 2020-07-06 PROCEDURE — 1125F AMNT PAIN NOTED PAIN PRSNT: CPT | Mod: S$GLB,,, | Performed by: ORTHOPAEDIC SURGERY

## 2020-07-06 PROCEDURE — 1159F MED LIST DOCD IN RCRD: CPT | Mod: S$GLB,,, | Performed by: ORTHOPAEDIC SURGERY

## 2020-07-06 PROCEDURE — 36415 COLL VENOUS BLD VENIPUNCTURE: CPT | Mod: PO

## 2020-07-06 PROCEDURE — 1159F PR MEDICATION LIST DOCUMENTED IN MEDICAL RECORD: ICD-10-PCS | Mod: S$GLB,,, | Performed by: ORTHOPAEDIC SURGERY

## 2020-07-06 PROCEDURE — 99999 PR PBB SHADOW E&M-EST. PATIENT-LVL III: CPT | Mod: PBBFAC,,, | Performed by: ORTHOPAEDIC SURGERY

## 2020-07-06 PROCEDURE — 1101F PT FALLS ASSESS-DOCD LE1/YR: CPT | Mod: CPTII,S$GLB,, | Performed by: ORTHOPAEDIC SURGERY

## 2020-07-06 PROCEDURE — 80061 LIPID PANEL: CPT

## 2020-07-06 PROCEDURE — 99213 PR OFFICE/OUTPT VISIT, EST, LEVL III, 20-29 MIN: ICD-10-PCS | Mod: S$GLB,,, | Performed by: ORTHOPAEDIC SURGERY

## 2020-07-06 PROCEDURE — 99213 OFFICE O/P EST LOW 20 MIN: CPT | Mod: S$GLB,,, | Performed by: ORTHOPAEDIC SURGERY

## 2020-07-06 PROCEDURE — 80053 COMPREHEN METABOLIC PANEL: CPT

## 2020-07-06 PROCEDURE — 3008F PR BODY MASS INDEX (BMI) DOCUMENTED: ICD-10-PCS | Mod: CPTII,S$GLB,, | Performed by: ORTHOPAEDIC SURGERY

## 2020-07-06 PROCEDURE — 99999 PR PBB SHADOW E&M-EST. PATIENT-LVL III: ICD-10-PCS | Mod: PBBFAC,,, | Performed by: ORTHOPAEDIC SURGERY

## 2020-07-06 PROCEDURE — 84153 ASSAY OF PSA TOTAL: CPT

## 2020-07-06 NOTE — PROGRESS NOTES
70 years old complaining pain in his left hip for a few months time.  Feels the started after a fall.  Pain is been throughout 10 the pain scale.  Difficulty putting on shoes getting in an out of a car.  Points to the groin as location of his pain.  Anti-inflammatories seem to help      Exam shows hip range of motion reproduces his symptoms    X-rays show arthritic changes    Assessment:  Right hip arthrosis    Plan:  Symptomatic care, we discussed with him hip replacement surgery, follow-up as needed

## 2020-07-06 NOTE — LETTER
July 6, 2020      Cj Guzmán MD  [address]  [facility] [address]           [facility]  [address] [place] [address]  Phone: [phone]          Patient: Al Carter   MR Number: [id number]   YOB: 1950   Date of Visit: 7/6/2020       Dear Dr. Cj Guzmán:    Thank you for referring Al Carter to me for evaluation. Attached you will find relevant portions of my assessment and plan of care.    If you have questions, please do not hesitate to call me. I look forward to following Al Carter along with you.    Sincerely,    Joe Wolfe MD    Enclosure  CC:  No Recipients    If you would like to receive this communication electronically, please contact [email or web address] or [phone] to request more information on [facility] Link access.    For providers and/or their staff who would like to refer a patient to [facility], please contact us through our one-stop-shop provider referral line, [facility] , at [phone].    If you feel you have received this communication in error or would no longer like to receive these types of communications, please e-mail [email or web address]

## 2020-07-07 LAB — COMPLEXED PSA SERPL-MCNC: 2.3 NG/ML (ref 0–4)

## 2020-07-24 NOTE — TELEPHONE ENCOUNTER
----- Message from Benjamín Burgess sent at 7/24/2020  2:41 PM CDT -----  Regarding: self  Type: RX Refill Request    Who Called: Self    Have you contacted your pharmacy: no  Refill or New Rx: refill     RX Name and Strength: indomethacin (INDOCIN) 25 MG capsule                                                      traZODone (DESYREL) 100 MG tablet      Preferred Pharmacy with phone number: [id number] [place] [address] AT [facility] [address] [phone] (Phone)  [phone] (Fax)        Local or Mail Order local     Ordering Provider: Ramirez    Would the patient rather a call back or a response via [facility]?  call    Best Call Back Number: [phone]     Additional Information:  please contact the patient with lab results as well

## 2020-07-25 RX ORDER — INDOMETHACIN 25 MG/1
CAPSULE ORAL
Qty: 60 CAPSULE | Refills: 3 | Status: ON HOLD | OUTPATIENT
Start: 2020-07-25 | End: 2020-08-03 | Stop reason: HOSPADM

## 2020-07-25 RX ORDER — TRAZODONE HYDROCHLORIDE 100 MG/1
TABLET ORAL
Qty: 90 TABLET | Refills: 0 | Status: ON HOLD | OUTPATIENT
Start: 2020-07-25 | End: 2020-08-01

## 2020-07-30 PROBLEM — I21.9 MI (MYOCARDIAL INFARCTION): Status: ACTIVE | Noted: 2020-07-30

## 2020-07-30 PROBLEM — R57.0 CARDIOGENIC SHOCK: Status: ACTIVE | Noted: 2020-07-30

## 2020-07-30 PROBLEM — I21.02 STEMI INVOLVING LEFT ANTERIOR DESCENDING CORONARY ARTERY: Status: ACTIVE | Noted: 2020-07-30

## 2020-08-01 PROBLEM — I47.20 VENTRICULAR TACHYCARDIA: Status: ACTIVE | Noted: 2020-08-01

## 2020-08-03 PROBLEM — I21.3 ST ELEVATION MYOCARDIAL INFARCTION (STEMI): Status: ACTIVE | Noted: 2020-07-30

## 2020-08-04 ENCOUNTER — TELEPHONE (OUTPATIENT)
Dept: CARDIOLOGY | Facility: CLINIC | Age: 70
End: 2020-08-04

## 2020-08-04 NOTE — TELEPHONE ENCOUNTER
----- Message from Dayana Beckwith sent at 8/4/2020  1:53 PM CDT -----  Contact: spouse  Type: Needs Medical Advice  Who Called:  Jada Carter - spouse  Best Call Back Number: [phone]  Additional Information: not able to wear the Life Vest 4000 due to the weight and pain in his shoulder, wants to return it, states did research on it and patient should not be wearing now, it is over 2 decades old. . Contact to advise

## 2020-08-05 ENCOUNTER — TELEPHONE (OUTPATIENT)
Dept: FAMILY MEDICINE | Facility: CLINIC | Age: 70
End: 2020-08-05

## 2020-08-05 ENCOUNTER — TELEPHONE (OUTPATIENT)
Dept: CARDIOLOGY | Facility: CLINIC | Age: 70
End: 2020-08-05

## 2020-08-05 NOTE — TELEPHONE ENCOUNTER
----- Message from Vanesa Grayson sent at 8/5/2020  9:43 AM CDT -----  Contact: EFFIE STONE [[id number]]  Type: Patient Call Back    Who called: Jada Raul (Spouse)    What is the request in detail: Jada Stone (Spouse) is requesting a call back. She states that she was advised that he needs to schedule a follow up in a week. She states that he can not wait until 08/24 which is the next available.   Please advise.    Can the clinic reply by [facility]? No    Best call back number: [phone]    Additional Information: N/A

## 2020-08-05 NOTE — TELEPHONE ENCOUNTER
Tried to reach pt. No answer. Left message for pt to call back. Will offer first available w/ Dr Guzmán, as he will be out of the office, or sooner w/ another provider.

## 2020-08-05 NOTE — TELEPHONE ENCOUNTER
N/A AT THIS TIME, LMOM INFORMING PT OF PHONE NUMBER TO RETURN LIFE VEST  [phone] - TREVER (LIFE VEST REP) AND DR. BRADEN SOSA    WILL CALL BACK LATER TO TRY AND SPEAK WITH PT.

## 2020-08-06 ENCOUNTER — OFFICE VISIT (OUTPATIENT)
Dept: FAMILY MEDICINE | Facility: CLINIC | Age: 70
End: 2020-08-06
Payer: MEDICARE

## 2020-08-06 ENCOUNTER — LAB VISIT (OUTPATIENT)
Dept: LAB | Facility: HOSPITAL | Age: 70
End: 2020-08-06
Attending: INTERNAL MEDICINE
Payer: MEDICARE

## 2020-08-06 VITALS
DIASTOLIC BLOOD PRESSURE: 60 MMHG | BODY MASS INDEX: 25.75 KG/M2 | SYSTOLIC BLOOD PRESSURE: 128 MMHG | OXYGEN SATURATION: 97 % | TEMPERATURE: 98 F | WEIGHT: 190.13 LBS | HEART RATE: 64 BPM | HEIGHT: 72 IN

## 2020-08-06 DIAGNOSIS — Z09 HOSPITAL DISCHARGE FOLLOW-UP: Primary | ICD-10-CM

## 2020-08-06 DIAGNOSIS — Z72.0 NICOTINE ABUSE: ICD-10-CM

## 2020-08-06 DIAGNOSIS — E78.5 DYSLIPIDEMIA: ICD-10-CM

## 2020-08-06 DIAGNOSIS — E66.3 OVERWEIGHT (BMI 25.0-29.9): ICD-10-CM

## 2020-08-06 DIAGNOSIS — Z95.5 S/P CORONARY ARTERY STENT PLACEMENT: ICD-10-CM

## 2020-08-06 DIAGNOSIS — E78.2 MIXED HYPERLIPIDEMIA: ICD-10-CM

## 2020-08-06 DIAGNOSIS — Z78.9 STATIN INTOLERANCE: ICD-10-CM

## 2020-08-06 DIAGNOSIS — I25.9 ISCHEMIC HEART DISEASE DUE TO CORONARY ARTERY OBSTRUCTION: ICD-10-CM

## 2020-08-06 DIAGNOSIS — D64.9 NORMOCYTIC ANEMIA: ICD-10-CM

## 2020-08-06 DIAGNOSIS — I24.0 ISCHEMIC HEART DISEASE DUE TO CORONARY ARTERY OBSTRUCTION: ICD-10-CM

## 2020-08-06 DIAGNOSIS — R74.01 TRANSAMINITIS: ICD-10-CM

## 2020-08-06 LAB
BASOPHILS # BLD AUTO: 0.07 K/UL (ref 0–0.2)
BASOPHILS NFR BLD: 0.7 % (ref 0–1.9)
DIFFERENTIAL METHOD: ABNORMAL
EOSINOPHIL # BLD AUTO: 0.4 K/UL (ref 0–0.5)
EOSINOPHIL NFR BLD: 4.3 % (ref 0–8)
ERYTHROCYTE [DISTWIDTH] IN BLOOD BY AUTOMATED COUNT: 13.4 % (ref 11.5–14.5)
HCT VFR BLD AUTO: 31.7 % (ref 40–54)
HGB BLD-MCNC: 9.7 G/DL (ref 14–18)
IMM GRANULOCYTES # BLD AUTO: 0.07 K/UL (ref 0–0.04)
IMM GRANULOCYTES NFR BLD AUTO: 0.7 % (ref 0–0.5)
LYMPHOCYTES # BLD AUTO: 1.3 K/UL (ref 1–4.8)
LYMPHOCYTES NFR BLD: 12.6 % (ref 18–48)
MCH RBC QN AUTO: 31.2 PG (ref 27–31)
MCHC RBC AUTO-ENTMCNC: 30.6 G/DL (ref 32–36)
MCV RBC AUTO: 102 FL (ref 82–98)
MONOCYTES # BLD AUTO: 1.4 K/UL (ref 0.3–1)
MONOCYTES NFR BLD: 13.5 % (ref 4–15)
NEUTROPHILS # BLD AUTO: 7 K/UL (ref 1.8–7.7)
NEUTROPHILS NFR BLD: 68.2 % (ref 38–73)
NRBC BLD-RTO: 0 /100 WBC
PLATELET # BLD AUTO: 303 K/UL (ref 150–350)
PMV BLD AUTO: 12.2 FL (ref 9.2–12.9)
RBC # BLD AUTO: 3.11 M/UL (ref 4.6–6.2)
RETICS/RBC NFR AUTO: 4.2 % (ref 0.4–2)
WBC # BLD AUTO: 10.28 K/UL (ref 3.9–12.7)

## 2020-08-06 PROCEDURE — 1159F PR MEDICATION LIST DOCUMENTED IN MEDICAL RECORD: ICD-10-PCS | Mod: S$GLB,,, | Performed by: INTERNAL MEDICINE

## 2020-08-06 PROCEDURE — 36415 COLL VENOUS BLD VENIPUNCTURE: CPT | Mod: PN

## 2020-08-06 PROCEDURE — 3078F PR MOST RECENT DIASTOLIC BLOOD PRESSURE < 80 MM HG: ICD-10-PCS | Mod: CPTII,S$GLB,, | Performed by: INTERNAL MEDICINE

## 2020-08-06 PROCEDURE — 99999 PR PBB SHADOW E&M-EST. PATIENT-LVL III: ICD-10-PCS | Mod: PBBFAC,,, | Performed by: INTERNAL MEDICINE

## 2020-08-06 PROCEDURE — 1101F PT FALLS ASSESS-DOCD LE1/YR: CPT | Mod: CPTII,S$GLB,, | Performed by: INTERNAL MEDICINE

## 2020-08-06 PROCEDURE — 3074F SYST BP LT 130 MM HG: CPT | Mod: CPTII,S$GLB,, | Performed by: INTERNAL MEDICINE

## 2020-08-06 PROCEDURE — 99214 PR OFFICE/OUTPT VISIT, EST, LEVL IV, 30-39 MIN: ICD-10-PCS | Mod: S$GLB,,, | Performed by: INTERNAL MEDICINE

## 2020-08-06 PROCEDURE — 1159F MED LIST DOCD IN RCRD: CPT | Mod: S$GLB,,, | Performed by: INTERNAL MEDICINE

## 2020-08-06 PROCEDURE — 99214 OFFICE O/P EST MOD 30 MIN: CPT | Mod: S$GLB,,, | Performed by: INTERNAL MEDICINE

## 2020-08-06 PROCEDURE — 99999 PR PBB SHADOW E&M-EST. PATIENT-LVL III: CPT | Mod: PBBFAC,,, | Performed by: INTERNAL MEDICINE

## 2020-08-06 PROCEDURE — 83540 ASSAY OF IRON: CPT

## 2020-08-06 PROCEDURE — 3078F DIAST BP <80 MM HG: CPT | Mod: CPTII,S$GLB,, | Performed by: INTERNAL MEDICINE

## 2020-08-06 PROCEDURE — 85045 AUTOMATED RETICULOCYTE COUNT: CPT

## 2020-08-06 PROCEDURE — 3074F PR MOST RECENT SYSTOLIC BLOOD PRESSURE < 130 MM HG: ICD-10-PCS | Mod: CPTII,S$GLB,, | Performed by: INTERNAL MEDICINE

## 2020-08-06 PROCEDURE — 1101F PR PT FALLS ASSESS DOC 0-1 FALLS W/OUT INJ PAST YR: ICD-10-PCS | Mod: CPTII,S$GLB,, | Performed by: INTERNAL MEDICINE

## 2020-08-06 PROCEDURE — 85025 COMPLETE CBC W/AUTO DIFF WBC: CPT

## 2020-08-06 PROCEDURE — 82728 ASSAY OF FERRITIN: CPT

## 2020-08-06 PROCEDURE — 3008F BODY MASS INDEX DOCD: CPT | Mod: CPTII,S$GLB,, | Performed by: INTERNAL MEDICINE

## 2020-08-06 PROCEDURE — 3008F PR BODY MASS INDEX (BMI) DOCUMENTED: ICD-10-PCS | Mod: CPTII,S$GLB,, | Performed by: INTERNAL MEDICINE

## 2020-08-06 RX ORDER — ASPIRIN 325 MG
325 TABLET ORAL DAILY
COMMUNITY

## 2020-08-06 RX ORDER — ROSUVASTATIN CALCIUM 20 MG/1
TABLET, COATED ORAL
Qty: 30 TABLET | Refills: 2 | Status: SHIPPED | OUTPATIENT
Start: 2020-08-06

## 2020-08-06 NOTE — PATIENT INSTRUCTIONS
Mixed hyperlipidemia: Maintain low fat high fiber diet, exercise regularly. Weight reduction where indicated. Willing to try crestor again.   Add Qunol otc at 100 mg a day, to try to prevent muscle ache.  -     rosuvastatin (CRESTOR) 20 MG tablet; 20 mg po daily; wants to try again.  Dispense: 30 tablet; Refill: 2  -     Comprehensive metabolic panel; Future; Expected date: 08/06/2020  -     Lipid Panel; Future; Expected date: 08/06/2020    Dyslipidemia: Maintain low fat high fiber diet, exercise regularly. Weight reduction where indicated.  -     rosuvastatin (CRESTOR) 20 MG tablet; 20 mg po daily; wants to try again.  Dispense: 30 tablet; Refill: 2  -     Comprehensive metabolic panel; Future; Expected date: 08/06/2020  -     Lipid Panel; Future; Expected date: 08/06/2020    S/P coronary artery stent placement: doing better since stents placed; f/u w dr Garcia as directed. In 7-8 weeks.   -     rosuvastatin (CRESTOR) 20 MG tablet; 20 mg po daily; wants to try again.  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     Overweight (BMI 25.0-29.9): Caloric restriction w regular exercise and weight reduction. Card rehab as per cardiology.

## 2020-08-06 NOTE — PROGRESS NOTES
Subjective:       Patient ID: Al Carter is a 70 y.o. male.    Chief Complaint: Hospital Follow Up ([facility] follow up )    HPI  Here today from f/u of recent hosp at [facility] 7/29/20 thru around 8/4/20.  Seeing patient for Dr. Guzmán his PCP. Dung Garcia; Dr Chan did his intervention w 3 new stents placed and 2 re-do old stents done w 2 new stents placed. No c/o CP, SOB, or palp. More energy noted.  On Plavix 75 mg daily and  mg daily.     Mixed hyperlipidemia/ dyslipidemia:  07/30/2020 lipid profile:  Cholesterol 217/triglyceride 97/HDL 45 up from 36/.6; with LDL goal less than 60.  Lipitor has given muscle skeletal problems in the past.  He would like to try Crestor again.  Placed on rosuvastatin 20 mg daily.  If he starts with any muscle skeletal pain he is to start Qunol 100 mg per day.     Transaminitis:  Recent hepatic function test on 08/01/2020 shows AST improved to 123 though and ALT normal at 38.  Will need to watch closely with the addition of a statin but with 3 new coronary stents placed in 2 old stent reduce feel a statin is warranted with close follow-up.  Needs to avoid any alcohol or NSA ID agents.     Normocytic anemia:  08/01/2020 H&H 9.8/29.6 platelets 163.  Patient with a history of polyps but no complaints of melena, bright red blood per rectum, nausea vomiting, heartburn or any regular NSA ID use.     Tobacco abuse:  2 pack per day smoker started at 14 at 39 years has quit with recent events.  Will leave low-dose CT scan screen for colon cancer to his primary care physician for timing.     Total time 8:08 a.m. through 8:49 a.m..  Greater than 50% of the time spent in discussion, counseling, and review.  Labs reviewed and ordered for follow-up.  Medications reviewed and addressed.    Review of Systems   Constitutional: Negative for appetite change and unexpected weight change.   HENT: Negative for congestion, postnasal drip, rhinorrhea and sinus pressure.          Denies seasonal allergies, or perennial allergies   Eyes: Negative for discharge and itching.   Respiratory: Negative for cough, chest tightness, shortness of breath and wheezing.    Cardiovascular: Negative for chest pain, palpitations and leg swelling.   Gastrointestinal: Negative for abdominal pain, blood in stool, constipation, diarrhea, nausea and vomiting.   Endocrine: Negative for polydipsia, polyphagia and polyuria.   Genitourinary: Negative for dysuria and hematuria.   Musculoskeletal: Negative for arthralgias and myalgias.   Skin: Negative for rash.   Allergic/Immunologic: Negative for environmental allergies and food allergies.   Neurological: Negative for tremors, seizures and headaches.   Hematological: Negative for adenopathy. Does not bruise/bleed easily.   Psychiatric/Behavioral: Negative for dysphoric mood. The patient is not nervous/anxious.         Denies anxiety or depression.       Objective:      Vitals:    08/06/20 0753   BP: 128/60   BP Location: Left arm   Patient Position: Sitting   BP Method: Medium (Manual)   Pulse: 64   Temp: 98.2 °F (36.8 °C)   TempSrc: Oral   SpO2: 97%   Weight: 86.3 kg (190 lb 2.4 oz)   Height: 6' (1.829 m)     Body mass index is 25.79 kg/m².  Wt Readings from Last 3 Encounters:   08/06/20 86.3 kg (190 lb 2.4 oz)   07/29/20 90.3 kg (199 lb 1.2 oz)   07/06/20 92 kg (202 lb 13.2 oz)        Physical Exam  Vitals signs reviewed.   Constitutional:       Appearance: He is well-developed.   HENT:      Head: Normocephalic and atraumatic.   Neck:      Musculoskeletal: Normal range of motion and neck supple.      Thyroid: No thyromegaly.      Vascular: No carotid bruit.   Cardiovascular:      Rate and Rhythm: Normal rate and regular rhythm.      Heart sounds: Normal heart sounds. No murmur. No gallop.    Pulmonary:      Effort: Pulmonary effort is normal. No respiratory distress.      Breath sounds: Normal breath sounds. No wheezing or rales.   Abdominal:      General: Bowel  sounds are normal. There is no distension.      Palpations: Abdomen is soft.      Tenderness: There is no abdominal tenderness. There is no guarding or rebound.   Musculoskeletal: Normal range of motion.   Lymphadenopathy:      Cervical: No cervical adenopathy.   Skin:     Findings: No rash.   Neurological:      Mental Status: He is alert and oriented to person, place, and time.      Comments: Moves all 4 extremities fine.   Psychiatric:         Behavior: Behavior normal.         Thought Content: Thought content normal.         Assessment:       1. Hospital discharge follow-up    2. Mixed hyperlipidemia    3. Dyslipidemia    4. Statin intolerance    5. S/P coronary artery stent placement    6. Ischemic heart disease due to coronary artery obstruction    7. Nicotine abuse    8. Overweight (BMI 25.0-29.9)    9. Normocytic anemia    10. Transaminitis        Plan:       Hospital discharge follow-up:  Recently hospitalized 07/29/2020 through 08/04/2020.  Three new coronary stent placed and 2 whole stent reduce performed.  Doing well since his discharged; more energy noted.  No complaints of chest pain, shortness of breath or palpitations.    Mixed hyperlipidemia: Maintain low fat high fiber diet, exercise regularly. Weight reduction where indicated. Willing to try crestor again.   Add Qunol otc at 100 mg a day, to try to prevent muscle ache.  -     rosuvastatin (CRESTOR) 20 MG tablet; 20 mg po daily; wants to try again.  Dispense: 30 tablet; Refill: 2  -     Comprehensive metabolic panel; Future; Expected date: 08/06/2020  -     Lipid Panel; Future; Expected date: 08/06/2020    Dyslipidemia: Maintain low fat high fiber diet, exercise regularly. Weight reduction where indicated.  -     rosuvastatin (CRESTOR) 20 MG tablet; 20 mg po daily; wants to try again.  Dispense: 30 tablet; Refill: 2  -     Comprehensive metabolic panel; Future; Expected date: 08/06/2020  -     Lipid Panel; Future; Expected date:  08/06/2020    History of Statin tolerance:  Has had muscle skeletal complaints with Lipitor as well as Crestor but willing to try Crestor again.  If muscle skeletal complaints arise he is to start Qunol 100 mg p.o. daily over-the-counter and call us if they persist    S/P coronary artery stent placement: doing better since stents placed; f/u w dr Garcia as directed. In 7-8 weeks.   -     rosuvastatin (CRESTOR) 20 MG tablet; 20 mg po daily; wants to try again.  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  Timing of low-dose CT scan for lung cancer screening as per his primary care physician Dr. Guzmán    Overweight (BMI 25.0-29.9): Caloric restriction w regular exercise and weight reduction. Card rehab as per cardiology.    Normocytic anemia; w/u ordered. Men's 50+ MVI one a day.  Serum iron and ferritin levels ordered as well as retic count and stool for occult blood x3     Transaminitis:  Notice since his recent hospitalization for coronary stent placement.  Have ex patient to push fluids during the day and avoid any alcohol or NSA ID agents.  Will repeat CMP on follow-up for reassessment especially with adding a statin due to the coronary stents placed

## 2020-08-07 ENCOUNTER — TELEPHONE (OUTPATIENT)
Dept: FAMILY MEDICINE | Facility: CLINIC | Age: 70
End: 2020-08-07

## 2020-08-07 LAB
FERRITIN SERPL-MCNC: 122 NG/ML (ref 20–300)
IRON SERPL-MCNC: 36 UG/DL (ref 45–160)
SATURATED IRON: 9 % (ref 20–50)
TOTAL IRON BINDING CAPACITY: 380 UG/DL (ref 250–450)
TRANSFERRIN SERPL-MCNC: 257 MG/DL (ref 200–375)

## 2020-08-07 NOTE — TELEPHONE ENCOUNTER
----- Message from Dayana Guerra sent at 8/7/2020  1:36 PM CDT -----  Type:  Test Results    Who Called:  Wife (Jada)  Name of Test (Lab/Mammo/Etc):  Labs  Date of Test:  08/06/20  Ordering Provider:  Dr. Sanchez  Where the test was performed:  [facility] Call Back Number:  [phone]  Additional Information:

## 2020-08-10 ENCOUNTER — TELEPHONE (OUTPATIENT)
Dept: FAMILY MEDICINE | Facility: CLINIC | Age: 70
End: 2020-08-10

## 2020-08-10 ENCOUNTER — PATIENT OUTREACH (OUTPATIENT)
Dept: ADMINISTRATIVE | Facility: OTHER | Age: 70
End: 2020-08-10

## 2020-08-10 NOTE — TELEPHONE ENCOUNTER
----- Message from Nimo James sent at 8/10/2020  7:34 AM CDT -----  Contact: call   pt  wife  jelly //[phone]    Type: Needs Medical Advice  Who Called:   robson  jelly  Best Call Back Number: call   pt  kelvin reaves //[phone]  Additional Information:   pt  wife is  calling about a  fit kit // pt  wife is upset  and  wants to  speak to   the nurse // please  call  details

## 2020-08-10 NOTE — TELEPHONE ENCOUNTER
----- Message from Codie Posey sent at 8/10/2020  9:56 AM CDT -----  Regarding: Returning Call  Contact: Patient  Type:  Patient Returning Call    Who Called:  Wife, Jada  Who Left Message for Patient:  Kiara  Does the patient know what this is regarding?:  yes.   Best Call Back Number:  [phone]  Additional Information:    Patient states she has been trying to reach office, is having phone issues but cannot get anyone once she calls back- wife is very upset. Requesting call.  Has questions and concerns about fitkit that was given to patient on Friday.

## 2020-08-10 NOTE — TELEPHONE ENCOUNTER
----- Message from Carmelina Porter sent at 8/8/2020  8:12 AM CDT -----  Regarding: advice  Contact: wifeJada  Type: Needs Medical Advice  Who Called:  wifeSeymourie Raul  Symptoms (please be specific):    How long has patient had these symptoms:    Pharmacy name and phone #:    Best Call Back Number: [phone] (home)   Additional Information: Patient's wife would like to discuss the 3 day fecal test. Wife states patient is not able to do the test due to their living arrangements. Please call Jada. Thanks!

## 2020-08-10 NOTE — TELEPHONE ENCOUNTER
----- Message from Dayana Beckwith sent at 8/10/2020 10:47 AM CDT -----  Contact: spouse  Type: Needs Medical Advice  Who Called:  Jada - spouse  Best Call Back Number: [phone]  Additional Information: patient given a stool sample kit, patient is not able to do at this time, contact to advise if he will be charged for this now or when the samples are tested.  Does patient need to bring testing samples back to the office not to be charged.

## 2020-08-15 LAB — HEMOCCULT STL QL IA: NEGATIVE

## 2020-08-16 ENCOUNTER — TELEPHONE (OUTPATIENT)
Dept: FAMILY MEDICINE | Facility: CLINIC | Age: 70
End: 2020-08-16

## 2020-08-16 DIAGNOSIS — D53.9 MACROCYTIC ANEMIA: Primary | ICD-10-CM

## 2020-08-16 NOTE — TELEPHONE ENCOUNTER
Please ask patient to obtain a B12 and folic acid level at the lab which has been ordered to further evaluation of his  anemia; please have him push fluids during the day to help keep his liver well hydrated and kidney.    Please ask him not to drink any alcohol because that will raise his liver enzymes.  Please ask him if he does drink alcohol how much he has been drinking.  I would also like him to take men's 50+ multivitamin 1 a day as well as that would help his anemia.       Please also remind him to do the stool for occult blood cards x3 to check his stool for any blood loss.  Please also remind him not to take any NSA ID agents like Aleve/Naprosyn or ibuprofen/Advil/Motrin.  He can use Tylenol over-the-counter for any pain

## 2020-08-16 NOTE — TELEPHONE ENCOUNTER
Please ask patient to schedule follow-up appointment to see Dr. Guzmán in 2 months with his labs 1 week prior after an overnight fast from 08/06/2020 orders by me; thank you

## 2020-08-19 ENCOUNTER — OFFICE VISIT (OUTPATIENT)
Dept: CARDIOLOGY | Facility: CLINIC | Age: 70
End: 2020-08-19
Payer: MEDICARE

## 2020-08-19 VITALS
BODY MASS INDEX: 27.11 KG/M2 | HEART RATE: 55 BPM | SYSTOLIC BLOOD PRESSURE: 124 MMHG | WEIGHT: 200.19 LBS | HEIGHT: 72 IN | DIASTOLIC BLOOD PRESSURE: 51 MMHG

## 2020-08-19 DIAGNOSIS — I42.9 CARDIOMYOPATHY, UNSPECIFIED TYPE: Primary | ICD-10-CM

## 2020-08-19 DIAGNOSIS — I25.9 ISCHEMIC HEART DISEASE DUE TO CORONARY ARTERY OBSTRUCTION: ICD-10-CM

## 2020-08-19 DIAGNOSIS — R57.0 CARDIOGENIC SHOCK: ICD-10-CM

## 2020-08-19 DIAGNOSIS — I24.0 ISCHEMIC HEART DISEASE DUE TO CORONARY ARTERY OBSTRUCTION: ICD-10-CM

## 2020-08-19 DIAGNOSIS — E78.2 MIXED HYPERLIPIDEMIA: ICD-10-CM

## 2020-08-19 DIAGNOSIS — I25.10 CORONARY ARTERY DISEASE, ANGINA PRESENCE UNSPECIFIED, UNSPECIFIED VESSEL OR LESION TYPE, UNSPECIFIED WHETHER NATIVE OR TRANSPLANTED HEART: ICD-10-CM

## 2020-08-19 DIAGNOSIS — I21.9 MYOCARDIAL INFARCTION, UNSPECIFIED MI TYPE, UNSPECIFIED ARTERY: ICD-10-CM

## 2020-08-19 DIAGNOSIS — I47.20 VENTRICULAR TACHYCARDIA: ICD-10-CM

## 2020-08-19 PROCEDURE — 3074F SYST BP LT 130 MM HG: CPT | Mod: CPTII,S$GLB,, | Performed by: PHYSICIAN ASSISTANT

## 2020-08-19 PROCEDURE — 1159F PR MEDICATION LIST DOCUMENTED IN MEDICAL RECORD: ICD-10-PCS | Mod: S$GLB,,, | Performed by: PHYSICIAN ASSISTANT

## 2020-08-19 PROCEDURE — 1101F PT FALLS ASSESS-DOCD LE1/YR: CPT | Mod: CPTII,S$GLB,, | Performed by: PHYSICIAN ASSISTANT

## 2020-08-19 PROCEDURE — 1126F PR PAIN SEVERITY QUANTIFIED, NO PAIN PRESENT: ICD-10-PCS | Mod: S$GLB,,, | Performed by: PHYSICIAN ASSISTANT

## 2020-08-19 PROCEDURE — 99999 PR PBB SHADOW E&M-EST. PATIENT-LVL III: ICD-10-PCS | Mod: PBBFAC,,, | Performed by: PHYSICIAN ASSISTANT

## 2020-08-19 PROCEDURE — 99214 PR OFFICE/OUTPT VISIT, EST, LEVL IV, 30-39 MIN: ICD-10-PCS | Mod: S$GLB,,, | Performed by: PHYSICIAN ASSISTANT

## 2020-08-19 PROCEDURE — 99999 PR PBB SHADOW E&M-EST. PATIENT-LVL III: CPT | Mod: PBBFAC,,, | Performed by: PHYSICIAN ASSISTANT

## 2020-08-19 PROCEDURE — 1126F AMNT PAIN NOTED NONE PRSNT: CPT | Mod: S$GLB,,, | Performed by: PHYSICIAN ASSISTANT

## 2020-08-19 PROCEDURE — 1159F MED LIST DOCD IN RCRD: CPT | Mod: S$GLB,,, | Performed by: PHYSICIAN ASSISTANT

## 2020-08-19 PROCEDURE — 1101F PR PT FALLS ASSESS DOC 0-1 FALLS W/OUT INJ PAST YR: ICD-10-PCS | Mod: CPTII,S$GLB,, | Performed by: PHYSICIAN ASSISTANT

## 2020-08-19 PROCEDURE — 3074F PR MOST RECENT SYSTOLIC BLOOD PRESSURE < 130 MM HG: ICD-10-PCS | Mod: CPTII,S$GLB,, | Performed by: PHYSICIAN ASSISTANT

## 2020-08-19 PROCEDURE — 3008F BODY MASS INDEX DOCD: CPT | Mod: CPTII,S$GLB,, | Performed by: PHYSICIAN ASSISTANT

## 2020-08-19 PROCEDURE — 3078F DIAST BP <80 MM HG: CPT | Mod: CPTII,S$GLB,, | Performed by: PHYSICIAN ASSISTANT

## 2020-08-19 PROCEDURE — 99214 OFFICE O/P EST MOD 30 MIN: CPT | Mod: S$GLB,,, | Performed by: PHYSICIAN ASSISTANT

## 2020-08-19 PROCEDURE — 3008F PR BODY MASS INDEX (BMI) DOCUMENTED: ICD-10-PCS | Mod: CPTII,S$GLB,, | Performed by: PHYSICIAN ASSISTANT

## 2020-08-19 PROCEDURE — 3078F PR MOST RECENT DIASTOLIC BLOOD PRESSURE < 80 MM HG: ICD-10-PCS | Mod: CPTII,S$GLB,, | Performed by: PHYSICIAN ASSISTANT

## 2020-08-19 NOTE — PROGRESS NOTES
"Subjective:    Patient ID:  Al Carter is a 70 y.o. male who presents for follow-up of CAD, ICM.       HPI  Mr. Carter is a pleasant gentleman who follows with Dr. Garcia. He was hospitalized on 7/29 with inferior MI. He underwent emergent coronary angiography with PCI of the LAD, PCI of the LCx, and Impella placment. He was brought back the following day for PCI of the RCA (overlapping stents), POBA of the mid LAD, and Impella removal. His EF was 30% by echo. He was discharged with a LifeVest. He also had NSVT and PAF while in the hospital and was started on amiodarone and Eliquis. He was discharged on GDMT with aldactone, Demadex, and Coreg.     He is not wearing his LifeVest. He has not taken his amiodarone, Eliquis, Coreg, aldactone, or Demedex since discharge, though he states he plans to  his aldactone from the pharmacy today or tomorrow. He c/o MENJIVAR, PND, orthopnea, and LE edema. He "almost went back to the hospital" the other night due to SOB. No CP, palpitations, lightheadedness, syncope, or near syncope.     Review of Systems   Constitution: Negative for chills, diaphoresis, fever, weight gain and weight loss.   HENT: Negative for sore throat.    Eyes: Negative for blurred vision, vision loss in left eye, vision loss in right eye and visual disturbance.   Cardiovascular: Positive for dyspnea on exertion, leg swelling, orthopnea and paroxysmal nocturnal dyspnea. Negative for chest pain, claudication, near-syncope, palpitations and syncope.   Respiratory: Negative for cough, hemoptysis, shortness of breath, sputum production and wheezing.    Endocrine: Negative for cold intolerance and heat intolerance.   Hematologic/Lymphatic: Negative for adenopathy. Does not bruise/bleed easily.   Skin: Negative for rash.   Musculoskeletal: Negative for falls, muscle weakness and myalgias.   Gastrointestinal: Negative for abdominal pain, change in bowel habit, constipation, diarrhea, melena and nausea. "   Genitourinary: Negative for bladder incontinence.   Neurological: Negative for dizziness, focal weakness, headaches, light-headedness, numbness and weakness.   Psychiatric/Behavioral: Negative for altered mental status.         Vitals:    08/19/20 1510   BP: (!) 124/51   BP Location: Right arm   Patient Position: Sitting   BP Method: Large (Automatic)   Pulse: (!) 55   Weight: 90.8 kg (200 lb 2.8 oz)   Height: 6' (1.829 m)   Body mass index is 27.15 kg/m².    Objective:    Physical Exam   Constitutional: He is oriented to person, place, and time. He appears well-developed and well-nourished.   HENT:   Head: Normocephalic and atraumatic.   Eyes: Pupils are equal, round, and reactive to light. EOM are normal.   Neck: Neck supple. No JVD present. No tracheal deviation present. No thyromegaly present.   Cardiovascular: Normal rate, S1 normal, S2 normal, intact distal pulses and normal pulses. An irregularly irregular rhythm present. PMI is not displaced. Exam reveals no gallop and no friction rub.   No murmur heard.  Pulmonary/Chest: Effort normal and breath sounds normal. No respiratory distress. He has no wheezes. He has no rales. He exhibits no tenderness.   Abdominal: Soft. Bowel sounds are normal. He exhibits no distension and no mass. There is no abdominal tenderness.   Musculoskeletal: Normal range of motion.         General: Edema (BLE) present. No tenderness.   Neurological: He is alert and oriented to person, place, and time.   Skin: Skin is warm and dry. No rash noted.   Psychiatric: He has a normal mood and affect. His behavior is normal.         Assessment:       Problem List Items Addressed This Visit        Cardiology Problems    CAD (coronary artery disease)    Cardiogenic shock    Hyperlipidemia    Ischemic heart disease due to coronary artery obstruction    MI (myocardial infarction)    Ventricular tachycardia      Other Visit Diagnoses     Cardiomyopathy, unspecified type    -  Primary    Relevant  "Orders    Echo Color Flow Doppler? Yes             Plan:       Long discussion with patient stressing the importance of taking his medications. I went through each medication specifically and explained to him why we prescribed it and what it does. He will "think about it".   He is adamant he will not wear a LifeVest. He understands he is at risk for SCD.   F/U with Dr. Garcia with echo in 3 months. If no improvement in EF, will need ICD.         "

## 2020-08-26 DIAGNOSIS — R07.9 CHEST PAIN, UNSPECIFIED TYPE: Primary | ICD-10-CM

## 2020-08-26 NOTE — TELEPHONE ENCOUNTER
----- Message from Dayana Guerra sent at 8/26/2020  9:45 AM CDT -----  Type:  RX Refill Request    Who Called:  Patient  RX Name and Strength:  Nitroglycerin tablets  Preferred Pharmacy with phone number:  [facility] on [address]  Best Call Back Number:  [phone]  Additional Information:  Please call back to advise.

## 2020-08-27 RX ORDER — NITROGLYCERIN 0.4 MG/1
0.4 TABLET SUBLINGUAL EVERY 5 MIN PRN
Qty: 25 TABLET | Refills: 4 | Status: SHIPPED | OUTPATIENT
Start: 2020-08-27 | End: 2021-08-27

## 2020-09-08 ENCOUNTER — TELEPHONE (OUTPATIENT)
Dept: FAMILY MEDICINE | Facility: CLINIC | Age: 70
End: 2020-09-08

## 2020-09-08 DIAGNOSIS — D48.5 NEOPLASM OF UNCERTAIN BEHAVIOR OF SKIN OF EYELID: Primary | ICD-10-CM

## 2020-09-08 NOTE — TELEPHONE ENCOUNTER
----- Message from Sam Perkins sent at 9/8/2020  1:37 PM CDT -----  Type:  Patient Requesting Referral    Who Called:  Patient  Does the patient already have the specialty appointment scheduled?: No  If yes, what is the date of that appointment?:    Referral to What Specialty: Dermatology  Reason for Referral:  Growth on lower eyelid  Does the patient want the referral with a specific physician?:  Dr. Radhika Orozco  Is the specialist an [facility] or [facility]?:  [place]  Patient Requesting a Call Back?:  Yes  Best Call Back Number:  [phone]  Additional Information:

## 2020-09-29 ENCOUNTER — TELEPHONE (OUTPATIENT)
Dept: CARDIOLOGY | Facility: CLINIC | Age: 70
End: 2020-09-29

## 2020-09-29 ENCOUNTER — TELEPHONE (OUTPATIENT)
Dept: FAMILY MEDICINE | Facility: CLINIC | Age: 70
End: 2020-09-29

## 2020-09-29 NOTE — TELEPHONE ENCOUNTER
SPOKE W/ PT / PHONE - REQUESTING A MEDICATION FOR HIS ARTHRITIS PAIN THAT WILL NOT AFFECT HIS HEART... ADVISED TO SEE PCP, INFORMED HIM THAT DR. PAL WOULD KNOW WHICH MED TO RECOMMEND.  WILL FORWARD MESSAGE TO PCP

## 2020-09-29 NOTE — TELEPHONE ENCOUNTER
----- Message from Jackie Christy LPN sent at 9/28/2020  4:58 PM CDT -----  Contact: self [phone]    ----- Message -----  From: Lo Crowder  Sent: 9/28/2020   3:37 PM CDT  To: Radha SPARKS Staff    Would like to get medical advice.  Symptoms (please be specific):  left knee, right hip and left shoulder arthritis  How long has patient had these symptoms:  on going  Pharmacy name and phone #:  [id number] [place] [address] [facility] [address] [phone] (Phone)  [phone] (Fax)  Any drug allergies (copy from chart):    see chart  Would the patient rather a call back or a response via [facility]?:  call back  Comments:  Pt states he needs to see what he can take for relief. Please call and advise.

## 2020-09-29 NOTE — TELEPHONE ENCOUNTER
----- Message from Ruth Polk sent at 9/28/2020  5:05 PM CDT -----  Pt called, he was returning a call from the office this evening.  Pt can be reached at [phone]    Thank you

## 2020-09-30 NOTE — TELEPHONE ENCOUNTER
I spoke with him. Indocin is higher risk with his medical problems.  He usually just needs 3 days of tx every few weeks.  I suggested 2 aleve bid with omeprazole 20 mg on those days.

## 2020-10-14 ENCOUNTER — PATIENT OUTREACH (OUTPATIENT)
Dept: ADMINISTRATIVE | Facility: OTHER | Age: 70
End: 2020-10-14

## 2020-10-14 NOTE — PROGRESS NOTES
Health Maintenance Due   Topic Date Due    Shingles Vaccine (1 of 2) 01/11/1999    Pneumococcal Vaccine (65+ High/Highest Risk) (1 of 2 - PCV13) 01/11/2014    Influenza Vaccine (1) 08/01/2020     Updates were requested from care everywhere.  Chart was reviewed for overdue [facility]active [facility]counters ([facility]) topics (CRS, Breast Cancer Screening, Eye exam)  Health Maintenance has been updated.  LINKS immunization registry triggered.  Immunizations were reconciled.

## 2020-10-16 ENCOUNTER — TELEPHONE (OUTPATIENT)
Dept: DERMATOLOGY | Facility: CLINIC | Age: 70
End: 2020-10-16

## 2020-10-16 NOTE — TELEPHONE ENCOUNTER
----- Message from Melanie Pike sent at 10/16/2020 12:51 PM CDT -----  Regarding: scheduling  Contact: [phone]  Patient is calling to reschedule appointment on 10/15/2020. Please contact Patient [phone]

## 2020-10-19 ENCOUNTER — OFFICE VISIT (OUTPATIENT)
Dept: FAMILY MEDICINE | Facility: CLINIC | Age: 70
End: 2020-10-19
Payer: MEDICARE

## 2020-10-19 VITALS
BODY MASS INDEX: 26.37 KG/M2 | TEMPERATURE: 99 F | HEIGHT: 72 IN | OXYGEN SATURATION: 97 % | WEIGHT: 194.69 LBS | HEART RATE: 89 BPM | SYSTOLIC BLOOD PRESSURE: 126 MMHG | DIASTOLIC BLOOD PRESSURE: 62 MMHG

## 2020-10-19 DIAGNOSIS — L84 SKIN CALLUS: ICD-10-CM

## 2020-10-19 DIAGNOSIS — L03.031 CELLULITIS OF FIFTH TOE, RIGHT: Primary | ICD-10-CM

## 2020-10-19 DIAGNOSIS — L03.115 CELLULITIS OF RIGHT FOOT: ICD-10-CM

## 2020-10-19 PROCEDURE — 1101F PT FALLS ASSESS-DOCD LE1/YR: CPT | Mod: CPTII,S$GLB,, | Performed by: INTERNAL MEDICINE

## 2020-10-19 PROCEDURE — 1101F PR PT FALLS ASSESS DOC 0-1 FALLS W/OUT INJ PAST YR: ICD-10-PCS | Mod: CPTII,S$GLB,, | Performed by: INTERNAL MEDICINE

## 2020-10-19 PROCEDURE — 99213 OFFICE O/P EST LOW 20 MIN: CPT | Mod: S$GLB,,, | Performed by: INTERNAL MEDICINE

## 2020-10-19 PROCEDURE — 3008F BODY MASS INDEX DOCD: CPT | Mod: CPTII,S$GLB,, | Performed by: INTERNAL MEDICINE

## 2020-10-19 PROCEDURE — 3008F PR BODY MASS INDEX (BMI) DOCUMENTED: ICD-10-PCS | Mod: CPTII,S$GLB,, | Performed by: INTERNAL MEDICINE

## 2020-10-19 PROCEDURE — 1159F PR MEDICATION LIST DOCUMENTED IN MEDICAL RECORD: ICD-10-PCS | Mod: S$GLB,,, | Performed by: INTERNAL MEDICINE

## 2020-10-19 PROCEDURE — 99213 PR OFFICE/OUTPT VISIT, EST, LEVL III, 20-29 MIN: ICD-10-PCS | Mod: S$GLB,,, | Performed by: INTERNAL MEDICINE

## 2020-10-19 PROCEDURE — 3078F PR MOST RECENT DIASTOLIC BLOOD PRESSURE < 80 MM HG: ICD-10-PCS | Mod: CPTII,S$GLB,, | Performed by: INTERNAL MEDICINE

## 2020-10-19 PROCEDURE — 3074F SYST BP LT 130 MM HG: CPT | Mod: CPTII,S$GLB,, | Performed by: INTERNAL MEDICINE

## 2020-10-19 PROCEDURE — 99999 PR PBB SHADOW E&M-EST. PATIENT-LVL III: ICD-10-PCS | Mod: PBBFAC,,, | Performed by: INTERNAL MEDICINE

## 2020-10-19 PROCEDURE — 1159F MED LIST DOCD IN RCRD: CPT | Mod: S$GLB,,, | Performed by: INTERNAL MEDICINE

## 2020-10-19 PROCEDURE — 99999 PR PBB SHADOW E&M-EST. PATIENT-LVL III: CPT | Mod: PBBFAC,,, | Performed by: INTERNAL MEDICINE

## 2020-10-19 PROCEDURE — 3078F DIAST BP <80 MM HG: CPT | Mod: CPTII,S$GLB,, | Performed by: INTERNAL MEDICINE

## 2020-10-19 PROCEDURE — 3074F PR MOST RECENT SYSTOLIC BLOOD PRESSURE < 130 MM HG: ICD-10-PCS | Mod: CPTII,S$GLB,, | Performed by: INTERNAL MEDICINE

## 2020-10-19 RX ORDER — SPIRONOLACTONE 25 MG/1
TABLET ORAL NIGHTLY
COMMUNITY
Start: 2020-09-15

## 2020-10-19 RX ORDER — DOXYCYCLINE 100 MG/1
100 CAPSULE ORAL EVERY 12 HOURS
Qty: 20 CAPSULE | Refills: 0 | Status: SHIPPED | OUTPATIENT
Start: 2020-10-19 | End: 2020-10-29

## 2020-10-19 RX ORDER — TORSEMIDE 10 MG/1
10 TABLET ORAL EVERY MORNING
COMMUNITY
Start: 2020-09-15
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
## 2020-10-19 NOTE — PATIENT INSTRUCTIONS
Cellulitis of fifth toe, right; doxycycline 100 mg 2x a day for 10 days. Use open sandal. Tetanus UTD; 4 yrs ago. Elevate his foot at home. Avoid any extensive weight bearing.   -     Ambulatory referral/consult to Podiatry; Future; Expected date: 10/26/2020 Dr Echevarria    Cellulitis of right foot; doxycycline 100 mg 2x a day for 10 days.   -     Ambulatory referral/consult to Podiatry; Future; Expected date: 10/26/2020 Dr Echevarria    Skin callus; aquaphor topical 2x a day to callus. No closed in shoes.   -     Ambulatory referral/consult to Podiatry; Future; Expected date: 10/26/2020 Dr Echevarria    Other orders  -     doxycycline (VIBRAMYCIN) 100 MG Cap; Take 1 capsule (100 mg total) by mouth every 12 (twelve) hours. for 10 days  Dispense: 20 capsule; Refill: 0

## 2020-10-26 ENCOUNTER — OFFICE VISIT (OUTPATIENT)
Dept: ORTHOPEDICS | Facility: CLINIC | Age: 70
End: 2020-10-26
Payer: MEDICARE

## 2020-10-26 VITALS — BODY MASS INDEX: 26.28 KG/M2 | HEIGHT: 72 IN | WEIGHT: 194 LBS

## 2020-10-26 DIAGNOSIS — M19.011 PRIMARY OSTEOARTHRITIS OF BOTH SHOULDERS: Primary | ICD-10-CM

## 2020-10-26 DIAGNOSIS — M19.012 PRIMARY OSTEOARTHRITIS OF BOTH SHOULDERS: Primary | ICD-10-CM

## 2020-10-26 DIAGNOSIS — I25.10 CORONARY ARTERY DISEASE, ANGINA PRESENCE UNSPECIFIED, UNSPECIFIED VESSEL OR LESION TYPE, UNSPECIFIED WHETHER NATIVE OR TRANSPLANTED HEART: ICD-10-CM

## 2020-10-26 PROCEDURE — 3008F BODY MASS INDEX DOCD: CPT | Mod: CPTII,S$GLB,, | Performed by: ORTHOPAEDIC SURGERY

## 2020-10-26 PROCEDURE — 1101F PT FALLS ASSESS-DOCD LE1/YR: CPT | Mod: CPTII,S$GLB,, | Performed by: ORTHOPAEDIC SURGERY

## 2020-10-26 PROCEDURE — 20610 LARGE JOINT ASPIRATION/INJECTION: L SUBACROMIAL BURSA: ICD-10-PCS | Mod: LT,S$GLB,, | Performed by: ORTHOPAEDIC SURGERY

## 2020-10-26 PROCEDURE — 99214 OFFICE O/P EST MOD 30 MIN: CPT | Mod: 25,S$GLB,, | Performed by: ORTHOPAEDIC SURGERY

## 2020-10-26 PROCEDURE — 1159F MED LIST DOCD IN RCRD: CPT | Mod: S$GLB,,, | Performed by: ORTHOPAEDIC SURGERY

## 2020-10-26 PROCEDURE — 3008F PR BODY MASS INDEX (BMI) DOCUMENTED: ICD-10-PCS | Mod: CPTII,S$GLB,, | Performed by: ORTHOPAEDIC SURGERY

## 2020-10-26 PROCEDURE — 20610 DRAIN/INJ JOINT/BURSA W/O US: CPT | Mod: LT,S$GLB,, | Performed by: ORTHOPAEDIC SURGERY

## 2020-10-26 PROCEDURE — 1159F PR MEDICATION LIST DOCUMENTED IN MEDICAL RECORD: ICD-10-PCS | Mod: S$GLB,,, | Performed by: ORTHOPAEDIC SURGERY

## 2020-10-26 PROCEDURE — 99214 PR OFFICE/OUTPT VISIT, EST, LEVL IV, 30-39 MIN: ICD-10-PCS | Mod: 25,S$GLB,, | Performed by: ORTHOPAEDIC SURGERY

## 2020-10-26 PROCEDURE — 1101F PR PT FALLS ASSESS DOC 0-1 FALLS W/OUT INJ PAST YR: ICD-10-PCS | Mod: CPTII,S$GLB,, | Performed by: ORTHOPAEDIC SURGERY

## 2020-10-26 PROCEDURE — 99999 PR PBB SHADOW E&M-EST. PATIENT-LVL III: ICD-10-PCS | Mod: PBBFAC,,, | Performed by: ORTHOPAEDIC SURGERY

## 2020-10-26 PROCEDURE — 1125F PR PAIN SEVERITY QUANTIFIED, PAIN PRESENT: ICD-10-PCS | Mod: S$GLB,,, | Performed by: ORTHOPAEDIC SURGERY

## 2020-10-26 PROCEDURE — 99999 PR PBB SHADOW E&M-EST. PATIENT-LVL III: CPT | Mod: PBBFAC,,, | Performed by: ORTHOPAEDIC SURGERY

## 2020-10-26 PROCEDURE — 1125F AMNT PAIN NOTED PAIN PRSNT: CPT | Mod: S$GLB,,, | Performed by: ORTHOPAEDIC SURGERY

## 2020-10-26 RX ORDER — TRIAMCINOLONE ACETONIDE 40 MG/ML
80 INJECTION, SUSPENSION INTRA-ARTICULAR; INTRAMUSCULAR
Status: DISCONTINUED | OUTPATIENT
Start: 2020-10-26 | End: 2020-10-26 | Stop reason: HOSPADM

## 2020-10-26 RX ADMIN — TRIAMCINOLONE ACETONIDE 80 MG: 40 INJECTION, SUSPENSION INTRA-ARTICULAR; INTRAMUSCULAR at 10:10

## 2020-10-26 NOTE — PROCEDURES
Large Joint Aspiration/Injection: L subacromial bursa    Date/Time: 10/26/2020 10:30 AM  Performed by: Joe Wolfe MD  Authorized by: Joe Wolfe MD     Consent Done?:  Yes (Verbal)  Site marked: the procedure site was marked    Prep: patient was prepped and draped in usual sterile fashion      Details:  Needle Size:  21 G  Approach:  Posterior  Location:  Shoulder  Site:  L subacromial bursa  Medications:  80 mg triamcinolone acetonide 40 mg/mL  Patient tolerance:  Patient tolerated the procedure well with no immediate complications

## 2020-10-26 NOTE — PROGRESS NOTES
70 years old complaining of pain left greater than right shoulder for about a month's time.  Comes in today requesting an injection which has received in the past responded favorably to.  Pain is 4 on the pain scale.  Pain with overhead activity or reaching behind his back    Exam shows positive Neer Valdes impingement sign with weak and painful cuff strength, no signs infection or instability    X-rays show mild degenerative changes    Assessment:  Degenerative changes of the left shoulder    Plan:  Kenalog injection to the left shoulder, encourage home exercise program, follow-up as needed    Further History  Aching pain  Worse with activity  Relieved with rest  No other associated symptoms  No other radiation    Further Exam  Alert and oriented  Pleasant  Contralateral limb has appropriate range of motion for age and condition  Contralateral limb has appropriate strength for age and condition  Contralateral limb has appropriate stability  for age and condition  No adenopathy  Pulses are appropriate for current condition  Skin is intact        Chief Complaint    Chief Complaint   Patient presents with    Right Shoulder - Pain    Left Shoulder - Pain       HPI  Al Carter is a 70 y.o.  male who presents with       Past Medical History  Past Medical History:   Diagnosis Date    Anticoagulant long-term use     ASA 81 mg, Plavix    Arthritis     Knees and Hands    Cancer     Skin, face    Coronary artery disease     with 2 stents, lad, 2011    Hyperlipidemia     Melanoma     Squamous cell carcinoma        Past Surgical History  Past Surgical History:   Procedure Laterality Date    BACK SURGERY  1976, 1981    L5 Laminectomy, X 2    CARDIAC CATHETERIZATION  10/2011    2 stent LAD    CARDIAC SURGERY      CARPAL TUNNEL RELEASE Right 8/8/2019    Procedure: RELEASE, CARPAL TUNNEL;  Surgeon: Joe Wolfe MD;  Location: [facility];  Service: Orthopedics;  Laterality: Right;    COLONOSCOPY      CORONARY  ANGIOGRAPHY N/A 7/30/2020    Procedure: ANGIOGRAM, CORONARY ARTERY;  Surgeon: Yuniel Chan MD;  Location: [facility] CATH;  Service: Cardiology;  Laterality: N/A;    INSERTION OF INTRAVASCULAR MICROAXIAL BLOOD PUMP N/A 7/30/2020    Procedure: INSERTION, IMPELLA;  Surgeon: Yuniel Chan MD;  Location: [facility] CATH;  Service: Cardiology;  Laterality: N/A;    JOINT REPLACEMENT Right     Knee, total    KNEE ARTHROSCOPY Left        Medications  Current Outpatient Medications   Medication Sig    aspirin 325 MG tablet Take 325 mg by mouth once daily.    clopidogreL (PLAVIX) 75 mg tablet Take 1 tablet (75 mg total) by mouth once daily.    doxycycline (VIBRAMYCIN) 100 MG Cap Take 1 capsule (100 mg total) by mouth every 12 (twelve) hours. for 10 days    nitroGLYCERIN (NITROSTAT) 0.4 MG SL tablet Place 1 tablet (0.4 mg total) under the tongue every 5 (five) minutes as needed for Chest pain.    spironolactone (ALDACTONE) 25 MG tablet Take by mouth every evening. 1/2 tab daily pm    torsemide (DEMADEX) 10 MG Tab Take 10 mg by mouth every morning.     rosuvastatin (CRESTOR) 20 MG tablet 20 mg po daily; wants to try again. (Patient not taking: Reported on 10/26/2020)     No current facility-administered medications for this visit.      Facility-Administered Medications Ordered in Other Visits   Medication    lactated ringers infusion    lidocaine (PF) 10 mg/ml (1%) injection 10 mg       Allergies  Review of patient's allergies indicates:   Allergen Reactions    Crestor [rosuvastatin]     Lipitor [atorvastatin]     Statins-hmg-coa reductase inhibitors      Weakness and muscle pain       Family History  Family History   Problem Relation Age of Onset    Hypertension Sister     Diabetes Neg Hx        Social History  Social History     Socioeconomic History    Marital status:      Spouse name: Not on file    Number of children: Not on file    Years of education: Not on file    Highest education level: Not on file  "  Occupational History    Not on file   Social Needs    Financial resource strain: Not on file    Food insecurity     Worry: Not on file     Inability: Not on file    Transportation needs     Medical: Not on file     Non-medical: Not on file   Tobacco Use    Smoking status: Current Some Day Smoker     Packs/day: 2.00     Types: Cigarettes     Start date: 9/24/1964    Smokeless tobacco: Never Used   Substance and Sexual Activity    Alcohol use: Yes     Alcohol/week: 0.0 standard drinks     Comment: "doesn't know when"    Drug use: No    Sexual activity: Not on file   Lifestyle    Physical activity     Days per week: Not on file     Minutes per session: Not on file    Stress: Not on file   Relationships    Social connections     Talks on phone: Not on file     Gets together: Not on file     Attends Buddhism service: Not on file     Active member of club or organization: Not on file     Attends meetings of clubs or organizations: Not on file     Relationship status: Not on file   Other Topics Concern    Not on file   Social History Narrative    Not on file               Review of Systems     Constitutional: Negative    HENT: Negative  Eyes: Negative  Respiratory: Negative  Cardiovascular: Negative  Musculoskeletal: HPI  Skin: Negative  Neurological: Negative  Hematological: Negative  Endocrine: Negative                 Physical Exam    There were no vitals filed for this visit.  Body mass index is 26.31 kg/m².  Physical Examination:     General appearance -  well appearing, and in no distress  Mental status - awake  Neck - supple  Chest -  symmetric air entry  Heart - normal rate   Abdomen - soft      Assessment     1. Primary osteoarthritis of both shoulders    2. Coronary artery disease, angina presence unspecified, unspecified vessel or lesion type, unspecified whether native or transplanted heart          Plan  "

## 2020-11-01 ENCOUNTER — TELEPHONE (OUTPATIENT)
Dept: FAMILY MEDICINE | Facility: CLINIC | Age: 70
End: 2020-11-01

## 2020-11-01 NOTE — TELEPHONE ENCOUNTER
Please call patient and schedule appointment to see his PCP Dr. Guzmán within the next 7 days for follow-up of his toe and foot cellulitis; I do not see that his follow-up has been scheduled yet

## 2020-11-02 NOTE — TELEPHONE ENCOUNTER
Dr Guzmán is out of the office until 11/16/20 and first available is the following week. Would you like to see pt?

## 2020-11-04 ENCOUNTER — CLINICAL SUPPORT (OUTPATIENT)
Dept: CARDIOLOGY | Facility: CLINIC | Age: 70
End: 2020-11-04
Attending: PHYSICIAN ASSISTANT
Payer: MEDICARE

## 2020-11-04 VITALS — HEIGHT: 72 IN | WEIGHT: 194 LBS | BODY MASS INDEX: 26.28 KG/M2

## 2020-11-04 DIAGNOSIS — I42.9 CARDIOMYOPATHY, UNSPECIFIED TYPE: ICD-10-CM

## 2020-11-04 PROCEDURE — 99999 PR PBB SHADOW E&M-EST. PATIENT-LVL I: CPT | Mod: PBBFAC,,,

## 2020-11-04 PROCEDURE — 99999 PR PBB SHADOW E&M-EST. PATIENT-LVL I: ICD-10-PCS | Mod: PBBFAC,,,

## 2020-11-04 PROCEDURE — 93306 TTE W/DOPPLER COMPLETE: CPT | Mod: S$GLB,,, | Performed by: INTERNAL MEDICINE

## 2020-11-04 PROCEDURE — 93306 ECHO (CUPID ONLY): ICD-10-PCS | Mod: S$GLB,,, | Performed by: INTERNAL MEDICINE

## 2020-11-04 NOTE — TELEPHONE ENCOUNTER
Message sent to Dr. Sanchez who is out, recommend scheduling this patient with any available provider if he needs cellulitis follow up.

## 2020-11-05 LAB
AV INDEX (PROSTH): 0.93
AV MEAN GRADIENT: 4 MMHG
AV PEAK GRADIENT: 8 MMHG
AV VALVE AREA: 3.16 CM2
AV VELOCITY RATIO: 1.01
BSA FOR ECHO PROCEDURE: 2.11 M2
CV ECHO LV RWT: 0.28 CM
DOP CALC AO PEAK VEL: 1.41 M/S
DOP CALC AO VTI: 28.68 CM
DOP CALC LVOT AREA: 3.4 CM2
DOP CALC LVOT DIAMETER: 2.08 CM
DOP CALC LVOT PEAK VEL: 1.42 M/S
DOP CALC LVOT STROKE VOLUME: 90.71 CM3
DOP CALCLVOT PEAK VEL VTI: 26.71 CM
E WAVE DECELERATION TIME: 203.63 MSEC
E/A RATIO: 0.73
E/E' RATIO: 10.29 M/S
ECHO LV POSTERIOR WALL: 0.77 CM (ref 0.6–1.1)
FRACTIONAL SHORTENING: 29 % (ref 28–44)
INTERVENTRICULAR SEPTUM: 0.93 CM (ref 0.6–1.1)
IVRT: 68.51 MSEC
LA MAJOR: 5.24 CM
LA MINOR: 5.26 CM
LA WIDTH: 4.31 CM
LEFT ATRIUM SIZE: 3.99 CM
LEFT ATRIUM VOLUME INDEX: 36.5 ML/M2
LEFT ATRIUM VOLUME: 76.74 CM3
LEFT INTERNAL DIMENSION IN SYSTOLE: 3.88 CM (ref 2.1–4)
LEFT VENTRICLE DIASTOLIC VOLUME INDEX: 68.97 ML/M2
LEFT VENTRICLE DIASTOLIC VOLUME: 145.04 ML
LEFT VENTRICLE MASS INDEX: 81 G/M2
LEFT VENTRICLE SYSTOLIC VOLUME INDEX: 30.9 ML/M2
LEFT VENTRICLE SYSTOLIC VOLUME: 64.98 ML
LEFT VENTRICULAR INTERNAL DIMENSION IN DIASTOLE: 5.46 CM (ref 3.5–6)
LEFT VENTRICULAR MASS: 170.57 G
LV LATERAL E/E' RATIO: 9 M/S
LV SEPTAL E/E' RATIO: 12 M/S
MV PEAK A VEL: 0.99 M/S
MV PEAK E VEL: 0.72 M/S
PISA TR MAX VEL: 3.07 M/S
PULM VEIN S/D RATIO: 0.58
PV PEAK D VEL: 0.79 M/S
PV PEAK S VEL: 0.46 M/S
RA MAJOR: 5.21 CM
RA PRESSURE: 3 MMHG
RA WIDTH: 4.08 CM
RIGHT VENTRICULAR END-DIASTOLIC DIMENSION: 4.11 CM
SINUS: 3.16 CM
STJ: 2.89 CM
TDI LATERAL: 0.08 M/S
TDI SEPTAL: 0.06 M/S
TDI: 0.07 M/S
TR MAX PG: 38 MMHG
TRICUSPID ANNULAR PLANE SYSTOLIC EXCURSION: 2.41 CM
TV REST PULMONARY ARTERY PRESSURE: 41 MMHG

## 2020-11-05 NOTE — TELEPHONE ENCOUNTER
Spoke with pt and he states that his redness and swelling has gone and he is feeling better. He refused a follow up visit at this time. He will call and schedule if anything changes

## 2020-11-09 ENCOUNTER — OFFICE VISIT (OUTPATIENT)
Dept: CARDIOLOGY | Facility: CLINIC | Age: 70
End: 2020-11-09
Payer: MEDICARE

## 2020-11-09 VITALS
SYSTOLIC BLOOD PRESSURE: 143 MMHG | DIASTOLIC BLOOD PRESSURE: 76 MMHG | HEIGHT: 72 IN | HEART RATE: 67 BPM | WEIGHT: 192.44 LBS | BODY MASS INDEX: 26.07 KG/M2

## 2020-11-09 DIAGNOSIS — I24.0 ISCHEMIC HEART DISEASE DUE TO CORONARY ARTERY OBSTRUCTION: Primary | ICD-10-CM

## 2020-11-09 DIAGNOSIS — E78.2 MIXED HYPERLIPIDEMIA: ICD-10-CM

## 2020-11-09 DIAGNOSIS — I25.9 ISCHEMIC HEART DISEASE DUE TO CORONARY ARTERY OBSTRUCTION: Primary | ICD-10-CM

## 2020-11-09 DIAGNOSIS — I47.20 VENTRICULAR TACHYCARDIA: ICD-10-CM

## 2020-11-09 PROCEDURE — 3078F DIAST BP <80 MM HG: CPT | Mod: CPTII,S$GLB,, | Performed by: INTERNAL MEDICINE

## 2020-11-09 PROCEDURE — 99999 PR PBB SHADOW E&M-EST. PATIENT-LVL III: CPT | Mod: PBBFAC,,, | Performed by: INTERNAL MEDICINE

## 2020-11-09 PROCEDURE — 99214 OFFICE O/P EST MOD 30 MIN: CPT | Mod: S$GLB,,, | Performed by: INTERNAL MEDICINE

## 2020-11-09 PROCEDURE — 3077F PR MOST RECENT SYSTOLIC BLOOD PRESSURE >= 140 MM HG: ICD-10-PCS | Mod: CPTII,S$GLB,, | Performed by: INTERNAL MEDICINE

## 2020-11-09 PROCEDURE — 1159F MED LIST DOCD IN RCRD: CPT | Mod: S$GLB,,, | Performed by: INTERNAL MEDICINE

## 2020-11-09 PROCEDURE — 99214 PR OFFICE/OUTPT VISIT, EST, LEVL IV, 30-39 MIN: ICD-10-PCS | Mod: S$GLB,,, | Performed by: INTERNAL MEDICINE

## 2020-11-09 PROCEDURE — 3008F PR BODY MASS INDEX (BMI) DOCUMENTED: ICD-10-PCS | Mod: CPTII,S$GLB,, | Performed by: INTERNAL MEDICINE

## 2020-11-09 PROCEDURE — 99999 PR PBB SHADOW E&M-EST. PATIENT-LVL III: ICD-10-PCS | Mod: PBBFAC,,, | Performed by: INTERNAL MEDICINE

## 2020-11-09 PROCEDURE — 1101F PT FALLS ASSESS-DOCD LE1/YR: CPT | Mod: CPTII,S$GLB,, | Performed by: INTERNAL MEDICINE

## 2020-11-09 PROCEDURE — 1159F PR MEDICATION LIST DOCUMENTED IN MEDICAL RECORD: ICD-10-PCS | Mod: S$GLB,,, | Performed by: INTERNAL MEDICINE

## 2020-11-09 PROCEDURE — 3008F BODY MASS INDEX DOCD: CPT | Mod: CPTII,S$GLB,, | Performed by: INTERNAL MEDICINE

## 2020-11-09 PROCEDURE — 3078F PR MOST RECENT DIASTOLIC BLOOD PRESSURE < 80 MM HG: ICD-10-PCS | Mod: CPTII,S$GLB,, | Performed by: INTERNAL MEDICINE

## 2020-11-09 PROCEDURE — 1126F PR PAIN SEVERITY QUANTIFIED, NO PAIN PRESENT: ICD-10-PCS | Mod: S$GLB,,, | Performed by: INTERNAL MEDICINE

## 2020-11-09 PROCEDURE — 1126F AMNT PAIN NOTED NONE PRSNT: CPT | Mod: S$GLB,,, | Performed by: INTERNAL MEDICINE

## 2020-11-09 PROCEDURE — 1101F PR PT FALLS ASSESS DOC 0-1 FALLS W/OUT INJ PAST YR: ICD-10-PCS | Mod: CPTII,S$GLB,, | Performed by: INTERNAL MEDICINE

## 2020-11-09 PROCEDURE — 3077F SYST BP >= 140 MM HG: CPT | Mod: CPTII,S$GLB,, | Performed by: INTERNAL MEDICINE

## 2020-11-11 ENCOUNTER — PATIENT OUTREACH (OUTPATIENT)
Dept: ADMINISTRATIVE | Facility: HOSPITAL | Age: 70
End: 2020-11-11

## 2020-11-19 ENCOUNTER — PATIENT OUTREACH (OUTPATIENT)
Dept: ADMINISTRATIVE | Facility: OTHER | Age: 70
End: 2020-11-19

## 2020-11-19 NOTE — PROGRESS NOTES
Health Maintenance Due   Topic Date Due    Shingles Vaccine (1 of 2) 01/11/1999    Pneumococcal Vaccine (65+ High/Highest Risk) (1 of 2 - PCV13) 01/11/2014    Influenza Vaccine (1) 08/01/2020     Updates were requested from care everywhere.  Chart was reviewed for overdue Proactive [facility] Encounters ([facility]) topics (CRS, Breast Cancer Screening, Eye exam)  Health Maintenance has been updated.  LINKS immunization registry triggered.  LINKS not responding.

## 2020-11-20 ENCOUNTER — OFFICE VISIT (OUTPATIENT)
Dept: DERMATOLOGY | Facility: CLINIC | Age: 70
End: 2020-11-20
Payer: MEDICARE

## 2020-11-20 VITALS — HEIGHT: 72 IN | RESPIRATION RATE: 20 BRPM | BODY MASS INDEX: 26.1 KG/M2

## 2020-11-20 DIAGNOSIS — L57.0 AK (ACTINIC KERATOSIS): ICD-10-CM

## 2020-11-20 DIAGNOSIS — Z86.006 HISTORY OF MELANOMA IN SITU: ICD-10-CM

## 2020-11-20 DIAGNOSIS — L82.1 SEBORRHEIC KERATOSES: ICD-10-CM

## 2020-11-20 DIAGNOSIS — D22.9 MULTIPLE BENIGN NEVI: ICD-10-CM

## 2020-11-20 DIAGNOSIS — Z85.828 HISTORY OF NONMELANOMA SKIN CANCER: ICD-10-CM

## 2020-11-20 DIAGNOSIS — Z12.83 SCREENING EXAM FOR SKIN CANCER: Primary | ICD-10-CM

## 2020-11-20 PROCEDURE — 3008F PR BODY MASS INDEX (BMI) DOCUMENTED: ICD-10-PCS | Mod: CPTII,S$GLB,, | Performed by: DERMATOLOGY

## 2020-11-20 PROCEDURE — 17000 DESTRUCT PREMALG LESION: CPT | Mod: S$GLB,,, | Performed by: DERMATOLOGY

## 2020-11-20 PROCEDURE — 17000 PR DESTRUCTION(LASER SURGERY,CRYOSURGERY,CHEMOSURGERY),PREMALIGNANT LESIONS,FIRST LESION: ICD-10-PCS | Mod: S$GLB,,, | Performed by: DERMATOLOGY

## 2020-11-20 PROCEDURE — 1126F AMNT PAIN NOTED NONE PRSNT: CPT | Mod: S$GLB,,, | Performed by: DERMATOLOGY

## 2020-11-20 PROCEDURE — 99999 PR PBB SHADOW E&M-EST. PATIENT-LVL II: CPT | Mod: PBBFAC,,, | Performed by: DERMATOLOGY

## 2020-11-20 PROCEDURE — 99999 PR PBB SHADOW E&M-EST. PATIENT-LVL II: ICD-10-PCS | Mod: PBBFAC,,, | Performed by: DERMATOLOGY

## 2020-11-20 PROCEDURE — 1126F PR PAIN SEVERITY QUANTIFIED, NO PAIN PRESENT: ICD-10-PCS | Mod: S$GLB,,, | Performed by: DERMATOLOGY

## 2020-11-20 PROCEDURE — 1159F MED LIST DOCD IN RCRD: CPT | Mod: S$GLB,,, | Performed by: DERMATOLOGY

## 2020-11-20 PROCEDURE — 3008F BODY MASS INDEX DOCD: CPT | Mod: CPTII,S$GLB,, | Performed by: DERMATOLOGY

## 2020-11-20 PROCEDURE — 3288F PR FALLS RISK ASSESSMENT DOCUMENTED: ICD-10-PCS | Mod: CPTII,S$GLB,, | Performed by: DERMATOLOGY

## 2020-11-20 PROCEDURE — 1159F PR MEDICATION LIST DOCUMENTED IN MEDICAL RECORD: ICD-10-PCS | Mod: S$GLB,,, | Performed by: DERMATOLOGY

## 2020-11-20 PROCEDURE — 99203 OFFICE O/P NEW LOW 30 MIN: CPT | Mod: 25,S$GLB,, | Performed by: DERMATOLOGY

## 2020-11-20 PROCEDURE — 1101F PR PT FALLS ASSESS DOC 0-1 FALLS W/OUT INJ PAST YR: ICD-10-PCS | Mod: CPTII,S$GLB,, | Performed by: DERMATOLOGY

## 2020-11-20 PROCEDURE — 99203 PR OFFICE/OUTPT VISIT, NEW, LEVL III, 30-44 MIN: ICD-10-PCS | Mod: 25,S$GLB,, | Performed by: DERMATOLOGY

## 2020-11-20 PROCEDURE — 3288F FALL RISK ASSESSMENT DOCD: CPT | Mod: CPTII,S$GLB,, | Performed by: DERMATOLOGY

## 2020-11-20 PROCEDURE — 1101F PT FALLS ASSESS-DOCD LE1/YR: CPT | Mod: CPTII,S$GLB,, | Performed by: DERMATOLOGY

## 2020-11-20 NOTE — LETTER
November 20, 2020      Cj Guzmán MD  [address]  [facility] [address]           [facility]  [address] [place] [address]  Phone: [phone]  Fax: [phone]          Patient: Al Carter   MR Number: [id number]   YOB: 1950   Date of Visit: 11/20/2020       Dear Dr. Cj Guzmán:    Thank you for referring Al Carter to me for evaluation. Attached you will find relevant portions of my assessment and plan of care.    If you have questions, please do not hesitate to call me. I look forward to following Al aCrter along with you.    Sincerely,    Radha Majaon MD    Enclosure  CC:  No Recipients    If you would like to receive this communication electronically, please contact [email or web address] or [phone] to request more information on [employer] access.    For providers and/or their staff who would like to refer a patient to [facility], please contact us through our one-stop-shop provider referral line, [facility], at [phone].    If you feel you have received this communication in error or would no longer like to receive these types of communications, please e-mail [email or web address]

## 2020-11-20 NOTE — PROGRESS NOTES
Subjective:       Patient ID:  Al Carter is a 70 y.o. male who presents for   Chief Complaint   Patient presents with    Lesion     Patient present for initial visit with provider. Last O/V with Dr. Orozco 7/18/2016     C/o lesion to right cheek onset 6 - 8 weeks ago, reports increase in size, denies any change in shape or color, denies spontaneous bleeding or itching.  No treatment provided to this lesion.    C/o lesion to right lobule onset 1 - 2 months ago. The patient denies any change, including change in color, increase in size, or spontaneous bleeding, associated with this lesion.  No treatment to this lesion    yes Phx of NMSC.  SCC right cheek s/p excision by Dr. Cummings 6 - 8 years ago  Invasive SCC left thigh - LT 8/22/16  Melanoma in situ right cheek - PWS excised 8/3/2016  NMSC right forearm 10 years ago - pt unable to provide accurate history regarding treatment for this  no Fhx of melanoma.  Breast caner   Pancreatic cancer    Past Medical History:  No date: Anticoagulant long-term use      Comment:  ASA 81 mg, Plavix  No date: Arthritis      Comment:  Knees and Hands  No date: Cancer      Comment:  Skin, face  No date: Coronary artery disease      Comment:  with 2 stents, lad, 2011  No date: Hyperlipidemia  No date: Melanoma  No date: Squamous cell carcinoma      Review of Systems   Constitutional: Negative for fever, chills and fatigue.   HENT: Negative for nosebleeds and congestion.    Respiratory: Negative for cough.    Gastrointestinal: Negative for vomiting.   Skin: Negative for daily sunscreen use, activity-related sunscreen use, sensitivity to antibiotic ointment, sensitivity to bandage adhesive, tendency to form keloidal scars, recent sunburn and wears hat.   Hematologic/Lymphatic: Bruises/bleeds easily.        Objective:    Physical Exam   Constitutional: He appears well-developed and well-nourished. No distress.   HENT:   Head:       Eyes: Lids are normal.  No conjunctival no  injection.   Neurological: He is alert and oriented to person, place, and time. He is not disoriented.   Psychiatric: He has a normal mood and affect.   Skin:   Areas Examined (abnormalities noted in diagram):   Scalp / Hair Palpated and Inspected  Head / Face Inspection Performed  Neck Inspection Performed  Chest / Axilla Inspection Performed  Abdomen Inspection Performed  Genitals / Buttocks / Groin Inspection Performed  Back Inspection Performed  RUE Inspected  LUE Inspection Performed  RLE Inspected  LLE Inspection Performed  Nails and Digits Inspection Performed  Gland Inspection Performed                   Diagram Legend     Erythematous scaling macule/papule c/w actinic keratosis       Vascular papule c/w angioma      Pigmented verrucoid papule/plaque c/w seborrheic keratosis      Yellow umbilicated papule c/w sebaceous hyperplasia      Irregularly shaped tan macule c/w lentigo     1-2 mm smooth white papules consistent with Milia      Movable subcutaneous cyst with punctum c/w epidermal inclusion cyst      Subcutaneous movable cyst c/w pilar cyst      Firm pink to brown papule c/w dermatofibroma      Pedunculated fleshy papule(s) c/w skin tag(s)      Evenly pigmented macule c/w junctional nevus     Mildly variegated pigmented, slightly irregular-bordered macule c/w mildly atypical nevus      Flesh colored to evenly pigmented papule c/w intradermal nevus       Pink pearly papule/plaque c/w basal cell carcinoma      Erythematous hyperkeratotic cursted plaque c/w SCC      Surgical scar with no sign of skin cancer recurrence      Open and closed comedones      Inflammatory papules and pustules      Verrucoid papule consistent consistent with wart     Erythematous eczematous patches and plaques     Dystrophic onycholytic nail with subungual debris c/w onychomycosis     Umbilicated papule    Erythematous-base heme-crusted tan verrucoid plaque consistent with inflamed seborrheic keratosis     Erythematous Silvery  Scaling Plaque c/w Psoriasis     See annotation      Assessment / Plan:       AK (actinic keratosis)  Premalignant nature discussed     Cryosurgery Procedure Note    Verbal consent from the patient is obtained including, but not limited to, risk of hypopigmentation/hyperpigmentation, scar, recurrence of lesion. The patient is aware of the precancerous quality and need for treatment of these lesions. Liquid nitrogen cryosurgery is applied to the 1 actinic keratoses, as detailed in the physical exam, to produce a freeze injury. The patient is aware that blisters may form and is instructed on wound care with gentle cleansing and use of vaseline ointment to keep moist until healed. The patient is supplied a handout on cryosurgery and is instructed to call if lesions do not completely reolve.      Seborrheic keratoses  These are benign inherited growths without a malignant potential. Reassurance given to patient. No treatment is necessary.     Multiple benign nevi  total body skin examination performed today including at least 12 points as noted in physical examination. No lesions suspicious for malignancy noted.  Reassurance provided.  Instructed patient to observe lesion(s) for changes and follow up in clinic if changes are noted. Discussed ABCDE's of moles and brochure provided.      Screening exam for skin cancer  History of nonmelanoma skin cancer  History of melanoma in situ    Area of previous melanoma examined. Site well healed with no signs of recurrence.    Total body skin examination performed today including at least 12 points as noted in physical examination. No lesions suspicious for malignancy noted.                 Follow up in about 1 year (around 11/20/2021) for skin check.

## 2021-12-14 ENCOUNTER — PATIENT OUTREACH (OUTPATIENT)
Dept: ADMINISTRATIVE | Facility: HOSPITAL | Age: 71
End: 2021-12-14
Payer: MEDICARE

## 2022-01-12 NOTE — TELEPHONE ENCOUNTER
I spoke with him. It is possible that the L5-S1 situation is causing his right leg numbness.  He agrees to neurosurgical consult. Please schedule.   Name band;

## 2022-06-30 ENCOUNTER — PATIENT OUTREACH (OUTPATIENT)
Dept: ADMINISTRATIVE | Facility: HOSPITAL | Age: 72
End: 2022-06-30
Payer: MEDICARE

## 2022-06-30 NOTE — LETTER
June 30, 2022    Al Carter  [address] [address]             [facility]  [address] [address]  Phone: [phone] Mr. [facility]ades [facility] is committed to your overall health.  We would like to assist you in choosing a Primary Care Physician in place of your previous provider, Dr Guzmán.  You are eligible to receive care at the new [facility] clinic located at [address] in [place]. With [facility], you will work with and receive care from a complete team to help you achieve your health goals. Please call [phone] to schedule with one of our [facility] providers.  If you'd like to establish care with one of our other many preferred providers at one of our other [facility] locations, please call [phone] to schedule.     [facility]

## 2022-06-30 NOTE — PROGRESS NOTES
Pt outreach to establish with new PCP, as Dr Guzmán has retired    LMOR to return call to clinic    Letter out

## 2022-10-14 ENCOUNTER — PATIENT OUTREACH (OUTPATIENT)
Dept: ADMINISTRATIVE | Facility: HOSPITAL | Age: 72
End: 2022-10-14
Payer: MEDICARE

## 2023-04-28 NOTE — TRANSFER OF CARE
Anesthesia Transfer of Care Note    Patient: Al Carter    Procedure(s) Performed: Procedure(s) (LRB):  RELEASE, CARPAL TUNNEL (Right)    Patient location: PACU    Anesthesia Type: general    Transport from OR: Transported from OR on room air with adequate spontaneous ventilation    Post pain: adequate analgesia    Post assessment: no apparent anesthetic complications and tolerated procedure well    Post vital signs: stable    Level of consciousness: sedated    Nausea/Vomiting: no nausea/vomiting    Complications: none    Transfer of care protocol was followed      Last vitals:   Visit Vitals  /60 (BP Location: Right arm, Patient Position: Lying)   Pulse (!) 59   Temp 36.5 °C (97.7 °F) (Skin)   Resp 18   Ht 6' (1.829 m)   Wt 90.7 kg (200 lb)   SpO2 95%   BMI 27.12 kg/m²     
Attending Only
